# Patient Record
Sex: FEMALE | Race: WHITE | Employment: FULL TIME | URBAN - METROPOLITAN AREA
[De-identification: names, ages, dates, MRNs, and addresses within clinical notes are randomized per-mention and may not be internally consistent; named-entity substitution may affect disease eponyms.]

---

## 2017-03-17 PROBLEM — G89.29 CHRONIC BACK PAIN: Status: ACTIVE | Noted: 2017-03-17

## 2017-03-17 PROBLEM — M54.9 CHRONIC BACK PAIN: Status: ACTIVE | Noted: 2017-03-17

## 2017-03-23 PROBLEM — K57.32 DIVERTICULITIS OF LARGE INTESTINE WITHOUT PERFORATION OR ABSCESS WITHOUT BLEEDING: Status: ACTIVE | Noted: 2017-03-23

## 2017-04-27 ENCOUNTER — HOSPITAL ENCOUNTER (OUTPATIENT)
Dept: LAB | Age: 42
Discharge: HOME | End: 2017-04-27

## 2017-04-27 PROCEDURE — 88305 TISSUE EXAM BY PATHOLOGIST: CPT | Performed by: INTERNAL MEDICINE

## 2017-06-02 PROBLEM — K57.32 DIVERTICULITIS OF LARGE INTESTINE WITHOUT PERFORATION OR ABSCESS WITHOUT BLEEDING: Status: RESOLVED | Noted: 2017-03-23 | Resolved: 2017-06-02

## 2017-06-19 ENCOUNTER — HOSPITAL ENCOUNTER (OUTPATIENT)
Dept: PHYSICAL THERAPY | Age: 42
End: 2017-06-19
Attending: INTERNAL MEDICINE

## 2017-07-03 ENCOUNTER — HOSPITAL ENCOUNTER (OUTPATIENT)
Dept: PHYSICAL THERAPY | Age: 42
Discharge: HOME | End: 2017-07-03
Attending: INTERNAL MEDICINE
Payer: COMMERCIAL

## 2017-07-03 PROCEDURE — 97161 PT EVAL LOW COMPLEX 20 MIN: CPT | Performed by: PHYSICAL THERAPIST

## 2017-07-03 PROCEDURE — 97110 THERAPEUTIC EXERCISES: CPT | Performed by: PHYSICAL THERAPIST

## 2017-07-03 NOTE — PROGRESS NOTES
Ambulatory/Rehab Services H2 Model Falls Risk Assessment    Risk Factor Pts. ·   Confusion/Disorientation/Impulsivity  []    4 ·   Symptomatic Depression  []   2 ·   Altered Elimination  []   1 ·   Dizziness/Vertigo  []   1 ·   Gender (Male)  []   1 ·   Any administered antiepileptics (anticonvulsants):  []   2 ·   Any administered benzodiazepines:  []   1 ·   Visual Impairment (specify):  []   1 ·   Portable Oxygen Use  []   1 ·   Orthostatic ? BP  []   1 ·   History of Recent Falls (within 3 mos.)  []   5     Ability to Rise from Chair (choose one) Pts. ·   Ability to rise in a single movement  []   0 ·   Pushes up, successful in one attempt  []   1 ·   Multiple attempts, but successful  []   3 ·   Unable to rise without assistance  []   4   Total: (5 or greater = High Risk) 0     Falls Prevention Plan:   []                Physical Limitations to Exercise (specify):   []                Mobility Assistance Device (type):   []                Exercise/Equipment Adaptation (specify):    ©2010 [facility] [employer] [address] Patent #[id number].  Federal Law prohibits the replication, distribution or use without written permission from [facility] [employer]

## 2017-07-03 NOTE — PROGRESS NOTES
[facility]  : 1975 [address] at [place] [address] [address], [place], [address] [address]  [phone]   [phone]         OUTPATIENT PHYSICAL THERAPY:Initial Assessment 7/3/2017    ICD-10: Treatment Diagnosis: (M 25.551) R hip pain, (M 54.5) LBP, and (M62.81) muscle weakness  Precautions/Allergies: Other plant, animal, environmental none  Fall Risk Score: 0 (? 5 = High Risk)  MD Orders: Eval and rectus diastesis MEDICAL/REFERRING DIAGNOSIS:  Separation of muscle (nontraumatic), other site [M62.08]   DATE OF ONSET: 7/3/17  REFERRING PHYSICIAN: Paul Mello MD  RETURN PHYSICIAN APPOINTMENT: No f/u scheduled     INITIAL ASSESSMENT:  Ms. Diony Duque presents today with signs and symptoms consistent with the diagnosis of rectus diastesis secondary to pregnancy resulting in LBP and R hip pain from weak abdominals and hip musculature. She also presents with a forward L innominate rotation and rotational fault at L4-L5 due to core instability. Pt will benefit from skilled PT to address the following deficits. PROBLEM LIST (Impacting functional limitations):  1. Decreased Strength  2. Decreased ADL/Functional Activities  3. Decreased Transfer Abilities  4. Decreased Ambulation Ability/Technique  5. Decreased Balance  6. Increased Pain  7. Decreased Flexibility/Joint Mobility INTERVENTIONS PLANNED:  1. Balance Exercise  2. Bed Mobility  3. Cold  4. Cryotherapy  5. Electrical Stimulation  6. Heat  7. Home Exercise Program (HEP)  8. Manual Therapy  9. Neuromuscular Re-education/Strengthening  10. Range of Motion (ROM)  11. Therapeutic Exercise/Strengthening  12. Transfer Training   TREATMENT PLAN:  Effective Dates: 7/3/17 TO 9/3/17. Frequency/Duration: 2 times a week for 8 weeks  GOALS: (Goals have been discussed and agreed upon with patient.)  Short-Term Functional Goals: Time Frame: 4-6 weeks  1.  Pt will be compliant and independent with HEP, log rolling, car transfers and demonstrating correct, erect posture without an increase in back or hip pain. 2. Pt will improve LEFS score to 65/80 to increase pt's overall functional mobility. 3. Pt to subjectively report a decrease in pain to 1-2/10 @ worst to increase pt's ease with turning over in bed, and car transfers. 4. Pt will improve Lumbar AROM to Ext: 50% and B SB/ROT to 50% to increase pt's overall trunk mobility. 5. Pt will improve abdominal and B hip strength to > 3 to 3+/5 to allow pt to maintain neutral pelvic and lumbar alignment. Discharge Goals: Time Frame: 6-8 weeks  1. Pt will improve LEFS score to > 70/80 to increase pt's overall functional mobility. 2. Bed mobility and car transfers will be painfree. 3. Pt will improve Lumbar AROM to 75% of normal motion throughout with pain 1-2/10 at worst to increase pt's overall functional mobility. 4. Pt will return to gym activities, as well as, yoga/pilates maintaining normal pelvic and lumbar alignment with no incerase in LBP. 5. Pt will be discharged from PT to [facility]. Rehabilitation Potential For Stated Goals: Good  Regarding Hariniarpit Victor Hugo Ahuja's therapy, I certify that the treatment plan above will be carried out by a therapist or under their direction. Thank you for this referral,  Bianca Meyers, PT     Referring Physician Signature: Radha Shane MD              Date                    The information in this section was collected on 7/3/17 (except where otherwise noted). HISTORY:   History of Present Injury/Illness (Reason for Referral):  Pt reports she's had LBP and abdominal soreness off and on for the last 4 years since her second child was born. She suffered a rectus diastesis with him. 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 She states she feels when she exercises, her back and hip pain increase.    Past Medical History/Comorbidities: Ms. Macarena Blunt  has a past medical history of Abnormal Papanicolaou smear of cervix; Migraines; and Ovarian cyst.  Ms. Macarena Blunt  has a past surgical history that includes appendectomy;  section (2013); pelvic laparoscopy; cholecystectomy (2013); and colposcopy. Social History/Living Environment:     Pt lives in a 2 story home with her  and 2 children. She denies c/o LBP or hip pain with ascending/descending stairs. Prior Level of Function/Work/Activity:  Pt denies having rectus diastesis, abdominal weakness or LBP prior to her second pregnancy 4 years ago. Current Medications:       Current Outpatient Prescriptions:     butalbital-acetaminophen-caffeine (FIORICET, ESGIC) -40 mg per tablet, Take 1 Tab by mouth every six (6) hours as needed for Pain. Max Daily Amount: 4 Tabs., Disp: 20 Tab, Rfl: 3    loratadine (CLARITIN) 10 mg tablet, Take 10 mg by mouth daily as needed for Allergies. , Disp: , Rfl:    Date Last Reviewed:  7/3/17    Number of Personal Factors/Comorbidities that affect the Plan of Care:  0: LOW COMPLEXITY   EXAMINATION:   Observation/Orthostatic Postural Assessment:          Pt stands with increased weight through L LE off-weighting R. Pt denies c/o R side LBP or R hip pain with standing today. Palpation: pt minimally tender with palpation at R side L4-L5.   Posture/Deformity:  Lumbar AROM: % of normal motion in standing    Lumbar spine Date:  7/3/17 Date:   Date:     Direction  Parameters Parameters Parameters   Flexion  75%     Extension  25% w/ p     Rotation  R: 25% w/p  L: 25% w/p     Side bending  R: 25% w/p  L: 25% w/p     Hip R:WNL  L:WNL                   Strength Date:  7/3/17 Date:   Date:     Core/LE Parameters Parameters Parameters   Hip Flex R:3/5  L:3/5     Hip Ext R:3-/5  L:3-/5     Hip ABD R:3-/5  L:3-/5     Hip ADD R:3/5  L:3/5     Hip ER R:3/5  L:3/5     Hip IR R:3/5  L:3/5     Knee Ext R:4/5  L:4/5     Knee Flex R:3/5  L:3/5     Ankle DF R:4/  L:4/ Ankle PF R:4/5  L:4/5     Double Squat (L4) Able w/out UE A     Heel Walk (L5) Able w/out UE A     Toe Walk (S1) Able w/out UE A         Sensation: Normal and equal B throughout  Anterior Thigh (L3):  Medial Foot (L4):  Dorsal Foot (L5):  Lateral Foot (S1):    Special Test/Function:  Pelvic Obliquity: +; Pt with L anterior innominate rotation  Rotational Fault: FRSR @ L4 and L5  Accessory Mobility: Diminished L3-L5      Body Structures Involved:  1. Bones  2. Joints  3. Muscles  4. Ligaments Body Functions Affected:  1. Sensory/Pain  2. Neuromusculoskeletal  3. Movement Related  Activities and Participation Affected:  1. Mobility  2. Domestic Life  3. Community, Social and [place]  4. 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 Number of elements (examined above) that affect the Plan of Care:  1-2: LOW COMPLEXITY   CLINICAL PRESENTATION:    Presentation:  Stable and uncomplicated: LOW COMPLEXITY   CLINICAL DECISION MAKING:   Outcome Measure:   LEFS: 59/80=74% Function; 26% Disability    Medical Necessity:   · Patient is expected to demonstrate progress in strength, range of motion, balance, coordination and functional technique to increase independence with returning to gym activities and classes safely. .  Reason for Services/Other Comments:  · Patient continues to require present interventions due to patient's inability to maintain neutral pelvic and lumbar alignment due to abdominal, hip and lumbar weakness. .     Use of outcome tool(s) and clinical judgement create a POC that gives a: Clear prediction of patient's progress: LOW COMPLEXITY            TREATMENT:   (In addition to Assessment/Re-Assessment sessions the following treatments were rendered)  Pre-treatment Symptoms/Complaints:  Pt c/o R anterior hip pain, low back instability and abdominal weakness.   Pain: Initial:     3-4 Post Session:  1-2     THERAPEUTIC EXERCISE: (30 minutes):  Exercises per grid below to improve mobility and strength. Required moderate visual, verbal and tactile cues to promote proper body alignment, promote proper body posture and promote proper body breathing techniques. Progressed range and repetitions as indicated. Re-assessment was performed today (see above assessment section). Separate time was dedicated today for this re-assessment. Date:  7/3/17 Date:   Date:     Activity/Exercise Parameters Parameters Parameters   TAs 10 x 10sec     TAs w/ add sq 10 x 10sec     TAs w/ add sq and bridging 10 x 10sec     clams 15x B     HS/GS str 3 x 30sec B     Education Log-rolling and car transfers     MET for pelvic obliquity R quad/L HS     Manual correction for L4/5 rot Done seated           Treatment/Session Assessment: Pt with increased lumbar and pelvic laxity due to abdominal and hip weakness, as well as, pt just stopped nursing her 3year old ~ 3 months ago indicating hormone levels may not yet have stabilized. · Response to Treatment:  Pt's pelvic obliquity and rotational fault were easily corrected with MET and gentle mobilizations. She did well with the addition of stabilization exercises above. · Compliance with Program/Exercises: Will assess as treatment progresses. · Recommendations/Intent for next treatment session: \"Next visit will focus on advancements to more challenging activities and reduction in assistance provided\". Advance TA activities, as well as, hip strengthening.   Total Treatment Duration:PT Patient Time In/Time Out  Time In: 1310  Time Out: 3400 [facility], PT

## 2017-07-07 ENCOUNTER — HOSPITAL ENCOUNTER (OUTPATIENT)
Dept: PHYSICAL THERAPY | Age: 42
Discharge: HOME | End: 2017-07-07
Attending: INTERNAL MEDICINE
Payer: COMMERCIAL

## 2017-07-07 DIAGNOSIS — M62.08 RECTUS DIASTASIS: ICD-10-CM

## 2017-07-07 PROCEDURE — 97140 MANUAL THERAPY 1/> REGIONS: CPT | Performed by: PHYSICAL THERAPIST

## 2017-07-07 PROCEDURE — 97110 THERAPEUTIC EXERCISES: CPT | Performed by: PHYSICAL THERAPIST

## 2017-07-11 ENCOUNTER — HOSPITAL ENCOUNTER (OUTPATIENT)
Dept: PHYSICAL THERAPY | Age: 42
Discharge: HOME | End: 2017-07-11
Attending: INTERNAL MEDICINE
Payer: COMMERCIAL

## 2017-07-11 PROCEDURE — 97110 THERAPEUTIC EXERCISES: CPT | Performed by: PHYSICAL THERAPIST

## 2017-07-11 NOTE — PROGRESS NOTES
Jackelyn Olivia  : 1975 [address] [place] [address] at [address] [place] [address], [address], [address] [facility]  [phone]   [phone]         OUTPATIENT PHYSICAL THERAPY:Initial Assessment and Daily Note 2017    ICD-10: Treatment Diagnosis: (M 25.551) R hip pain, (M 54.5) LBP, and (M62.81) muscle weakness  Precautions/Allergies: Other plant, animal, environmental none  Fall Risk Score: 0 (? 5 = High Risk)  MD Orders: Eval and rectus diastesis MEDICAL/REFERRING DIAGNOSIS:  Separation of muscle (nontraumatic), other site [M62.08]   DATE OF ONSET: 7/3/17  REFERRING PHYSICIAN: Loly Sanchez MD  RETURN PHYSICIAN APPOINTMENT: No f/u scheduled     INITIAL ASSESSMENT:  Ms. Rhonda Durán presents today with signs and symptoms consistent with the diagnosis of rectus diastesis secondary to pregnancy resulting in LBP and R hip pain from weak abdominals and hip musculature. She also presents with a forward L innominate rotation and rotational fault at L4-L5 due to core instability. Pt will benefit from skilled PT to address the following deficits. PROBLEM LIST (Impacting functional limitations):  1. Decreased Strength  2. Decreased ADL/Functional Activities  3. Decreased Transfer Abilities  4. Decreased Ambulation Ability/Technique  5. Decreased Balance  6. Increased Pain  7. Decreased Flexibility/Joint Mobility INTERVENTIONS PLANNED:  1. Balance Exercise  2. Bed Mobility  3. Cold  4. Cryotherapy  5. Electrical Stimulation  6. Heat  7. Home Exercise Program (HEP)  8. Manual Therapy  9. Neuromuscular Re-education/Strengthening  10. Range of Motion (ROM)  11. Therapeutic Exercise/Strengthening  12. Transfer Training   TREATMENT PLAN:  Effective Dates: 7/3/17 TO 9/3/17. Frequency/Duration: 2 times a week for 8 weeks  GOALS: (Goals have been discussed and agreed upon with patient.)  Short-Term Functional Goals: Time Frame: 4-6 weeks  1.  Pt will be compliant and independent with HEP, log rolling, car transfers and demonstrating correct, erect posture without an increase in back or hip pain. 2. Pt will improve LEFS score to 65/80 to increase pt's overall functional mobility. 3. Pt to subjectively report a decrease in pain to 1-2/10 @ worst to increase pt's ease with turning over in bed, and car transfers. 4. Pt will improve Lumbar AROM to Ext: 50% and B SB/ROT to 50% to increase pt's overall trunk mobility. 5. Pt will improve abdominal and B hip strength to > 3 to 3+/5 to allow pt to maintain neutral pelvic and lumbar alignment. Discharge Goals: Time Frame: 6-8 weeks  1. Pt will improve LEFS score to > 70/80 to increase pt's overall functional mobility. 2. Bed mobility and car transfers will be painfree. 3. Pt will improve Lumbar AROM to 75% of normal motion throughout with pain 1-2/10 at worst to increase pt's overall functional mobility. 4. Pt will return to gym activities, as well as, yoga/pilates maintaining normal pelvic and lumbar alignment with no incerase in LBP. 5. Pt will be discharged from PT to [facility]. Rehabilitation Potential For Stated Goals: Good  Regarding Suki Rubio Seymour's therapy, I certify that the treatment plan above will be carried out by a therapist or under their direction. Thank you for this referral,  Meenu Metzger PT               The information in this section was collected on 7/3/17 (except where otherwise noted). HISTORY:   History of Present Injury/Illness (Reason for Referral):  Pt reports she's had LBP and abdominal soreness off and on for the last 4 years since her second child was born. She suffered a rectus diastesis with him. 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 She states she feels when she exercises, her back and hip pain increase.    Past Medical History/Comorbidities:   Ms. Hilario Van  has a past medical history of Abnormal Papanicolaou smear of cervix; Migraines; and Ovarian cyst.  Ms. Nany Bonilla  has a past surgical history that includes appendectomy;  section (2013); pelvic laparoscopy; cholecystectomy (2013); and colposcopy. Social History/Living Environment:     Pt lives in a 2 story home with her  and 2 children. She denies c/o LBP or hip pain with ascending/descending stairs. Prior Level of Function/Work/Activity:  Pt denies having rectus diastesis, abdominal weakness or LBP prior to her second pregnancy 4 years ago. Current Medications:       Current Outpatient Prescriptions:     butalbital-acetaminophen-caffeine (FIORICET, ESGIC) -40 mg per tablet, Take 1 Tab by mouth every six (6) hours as needed for Pain. Max Daily Amount: 4 Tabs., Disp: 20 Tab, Rfl: 3    loratadine (CLARITIN) 10 mg tablet, Take 10 mg by mouth daily as needed for Allergies. , Disp: , Rfl:    Date Last Reviewed:  7/3/17   EXAMINATION:   Observation/Orthostatic Postural Assessment:          Pt stands with increased weight through L LE off-weighting R. Pt denies c/o R side LBP or R hip pain with standing today. Palpation: pt minimally tender with palpation at R side L4-L5.   Posture/Deformity:  Lumbar AROM: % of normal motion in standing    Lumbar spine Date:  7/3/17 Date:   Date:     Direction  Parameters Parameters Parameters   Flexion  75%     Extension  25% w/ p     Rotation  R: 25% w/p  L: 25% w/p     Side bending  R: 25% w/p  L: 25% w/p     Hip R:WNL  L:WNL                   Strength Date:  7/3/17 Date:   Date:     Core/LE Parameters Parameters Parameters   Hip Flex R:3/5  L:3/5     Hip Ext R:3-/5  L:3-/5     Hip ABD R:3-/5  L:3-/5     Hip ADD R:3/5  L:3/5     Hip ER R:3/5  L:3/5     Hip IR R:3/5  L:3/5     Knee Ext R:4/5  L:4/5     Knee Flex R:3/5  L:3/5     Ankle DF R:4/5  L:4/5     Ankle PF R:4/5  L:4/5     Double Squat (L4) Able w/out UE A     Heel Walk (L5) Able w/out UE A     Toe Walk (S1) Able w/out UE A         Sensation: Normal and equal B throughout  Anterior Thigh (L3):  Medial Foot (L4):  Dorsal Foot (L5):  Lateral Foot (S1):    Special Test/Function:  Pelvic Obliquity: +; Pt with L anterior innominate rotation  Rotational Fault: FRSR @ L4 and L5  Accessory Mobility: Diminished L3-L5     CLINICAL PRESENTATION:   CLINICAL DECISION MAKING:   Outcome Measure:   LEFS: 59/80=74% Function; 26% Disability    Medical Necessity:   · Patient is expected to demonstrate progress in strength, range of motion, balance, coordination and functional technique to increase independence with returning to gym activities and classes safely. .  Reason for Services/Other Comments:  · Patient continues to require present interventions due to patient's inability to maintain neutral pelvic and lumbar alignment due to abdominal, hip and lumbar weakness. .            TREATMENT:   (In addition to Assessment/Re-Assessment sessions the following treatments were rendered)  Pre-treatment Symptoms/Complaints:  Pt reports she felt really good when she left her last PT visit, and for ~ 36 hours. She states her R anterior hip/groin pain returned on Sunday after moving furniture. She reports daily compliance with HEP. Pain: Initial:     3 Post Session:  1   MANUAL THERAPY: (5minutes): MET performed to correct pelvic obliquity  (L anterior innominate rotation) by engaging L HS and R quads. She also required gentle sacral and lumbar mobilizations (grade II and III) to correct mild rotational fault at L5 [facility]) and sacral torsion to the R.     THERAPEUTIC EXERCISE: (55 minutes):  Exercises per grid below to improve mobility and strength. Required moderate visual, verbal and tactile cues to promote proper body alignment, promote proper body posture and promote proper body breathing techniques. Progressed range and repetitions as indicated.      Date:  7/3/17 Date:  7/7/17 Date:  7/11/17   Activity/Exercise Parameters Parameters Parameters   Nu-Step   L4, 7min   TAs 10 x 10sec 10 x 5 sec 10 x 5 sec   TAs w/ add sq 10 x 10sec 10 x 5 sec 10 x 5 sec   TAs w/ add sq and bridging 10 x 10sec 10 x 5 sec 10 x 5 sec   LTR  20x 10x   Bridging w/ TA and band  RTB, 20x RTB/BTB, 30x total   clams 15x B  RTB, 2 x 15 B   Supine Ankle taps (EO/IO)   2 x 15 B   SLR   2 x 15 B   Supine hip flexor stretch off mat   3 x 20sec B   Piriformis stretch (post)  3 x 20sec B 3 x 20sec B   Mod piriformis stretch (ant)  3 x 20sec B    S/L hip flexor str w/ strap  3 x 20sec B    HS/GS str 3 x 30sec B     Resisted side-stepping   4L,RTB   Resisted diagonals    2L ea, RTB   Education Log-rolling and car transfers Use of ice and NSAIDS HEP 2x/day; NSAIDS   MET for pelvic obliquity R quad/L HS done done   Manual correction for L4/5 rot Done seated done done   Foam roll to B piriformis/TFL/ITB   6\"                     Treatment/Session Assessment: Pt with only mild R anterior innominate rotation on entry today. She was easily corrected with 1 round of MET. Her R hip musculature fatigued very quickly with addition of resisted calms, SLR and resisted side-stepping and diagonals due to abdominal and hip weakness. · Response to Treatment:  Pt denied an increase in pain during treatment today, despite progression of strengthening exercises. She did well with above exercises, therefore I added those to her HEP. · Compliance with Program/Exercises: Pt reports compliance with HEP  1x/daily. · Recommendations/Intent for next treatment session: \"Next visit will focus on advancements to more challenging activities and reduction in assistance provided\". Continue to advance TA activities, as well as, hip strengthening as pt is able.     Future Appointments  Date Time Provider Nila Luz   7/14/2017 8:00 AM Josselin Armando, PT [facility]   7/18/2017 8:00 AM Josselin Armando PT [facility]   7/21/2017 8:00 AM Josselin Armando PT [facility]   7/25/2017 8:00 AM Josselin Armando, PT [facility] 7/25/2017 11:30 AM [facility] ROOM 1 [facility] S[facility]   7/28/2017 8:00 AM Charly Kebede, PT [facility]   2/23/2018 8:00 AM [facility] LAB [facility]   3/2/2018 10:00 AM Erasmo Cowden, MD [facility] CAF CAF       Total Treatment Duration:PT Patient Time In/Time Out  Time In: 0805  Time Out: Lindsey Cosby 59, PT

## 2017-07-14 ENCOUNTER — HOSPITAL ENCOUNTER (OUTPATIENT)
Dept: PHYSICAL THERAPY | Age: 42
Discharge: HOME | End: 2017-07-14
Attending: INTERNAL MEDICINE
Payer: COMMERCIAL

## 2017-07-14 PROCEDURE — 97110 THERAPEUTIC EXERCISES: CPT | Performed by: PHYSICAL THERAPIST

## 2017-07-14 PROCEDURE — 97140 MANUAL THERAPY 1/> REGIONS: CPT | Performed by: PHYSICAL THERAPIST

## 2017-07-14 NOTE — PROGRESS NOTES
Chang Silveira  : 1975 [id number] at [address], [address] [place], [address] [address]  [phone]   [phone]         OUTPATIENT PHYSICAL THERAPY:Initial Assessment and Daily Note 2017    ICD-10: Treatment Diagnosis: (M 25.551) R hip pain, (M 54.5) LBP, and (M62.81) muscle weakness  Precautions/Allergies: Other plant, animal, environmental none  Fall Risk Score: 0 (? 5 = High Risk)  MD Orders: Eval and rectus diastesis MEDICAL/REFERRING DIAGNOSIS:  Separation of muscle (nontraumatic), other site [M62.08]   DATE OF ONSET: 7/3/17  REFERRING PHYSICIAN: Laurie Dangelo MD  RETURN PHYSICIAN APPOINTMENT: No f/u scheduled     INITIAL ASSESSMENT:  Ms. Belle Solis presents today with signs and symptoms consistent with the diagnosis of rectus diastesis secondary to pregnancy resulting in LBP and R hip pain from weak abdominals and hip musculature. She also presents with a forward L innominate rotation and rotational fault at L4-L5 due to core instability. Pt will benefit from skilled PT to address the following deficits. PROBLEM LIST (Impacting functional limitations):  1. Decreased Strength  2. Decreased ADL/Functional Activities  3. Decreased Transfer Abilities  4. Decreased Ambulation Ability/Technique  5. Decreased Balance  6. Increased Pain  7. Decreased Flexibility/Joint Mobility INTERVENTIONS PLANNED:  1. Balance Exercise  2. Bed Mobility  3. Cold  4. Cryotherapy  5. Electrical Stimulation  6. Heat  7. Home Exercise Program (HEP)  8. Manual Therapy  9. Neuromuscular Re-education/Strengthening  10. Range of Motion (ROM)  11. Therapeutic Exercise/Strengthening  12. Transfer Training   TREATMENT PLAN:  Effective Dates: 7/3/17 TO 9/3/17. Frequency/Duration: 2 times a week for 8 weeks  GOALS: (Goals have been discussed and agreed upon with patient.)  Short-Term Functional Goals: Time Frame: 4-6 weeks  1.  Pt will be compliant and independent with HEP, log rolling, car transfers and demonstrating correct, erect posture without an increase in back or hip pain. 2. Pt will improve LEFS score to 65/80 to increase pt's overall functional mobility. 3. Pt to subjectively report a decrease in pain to 1-2/10 @ worst to increase pt's ease with turning over in bed, and car transfers. 4. Pt will improve Lumbar AROM to Ext: 50% and B SB/ROT to 50% to increase pt's overall trunk mobility. 5. Pt will improve abdominal and B hip strength to > 3 to 3+/5 to allow pt to maintain neutral pelvic and lumbar alignment. Discharge Goals: Time Frame: 6-8 weeks  1. Pt will improve LEFS score to > 70/80 to increase pt's overall functional mobility. 2. Bed mobility and car transfers will be painfree. 3. Pt will improve Lumbar AROM to 75% of normal motion throughout with pain 1-2/10 at worst to increase pt's overall functional mobility. 4. Pt will return to gym activities, as well as, yoga/pilates maintaining normal pelvic and lumbar alignment with no incerase in LBP. 5. Pt will be discharged from PT to [facility]. Rehabilitation Potential For Stated Goals: Good  Regarding Ysabel Ahuja's therapy, I certify that the treatment plan above will be carried out by a therapist or under their direction. Thank you for this referral,  Duane Allison PT               The information in this section was collected on 7/3/17 (except where otherwise noted). HISTORY:   History of Present Injury/Illness (Reason for Referral):  Pt reports she's had LBP and abdominal soreness off and on for the last 4 years since her second child was born. She suffered a rectus diastesis with him. 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 She states she feels when she exercises, her back and hip pain increase.    Past Medical History/Comorbidities:   Ms. Uzma Estrada  has a past medical history of Abnormal Papanicolaou smear of cervix; Migraines; and Ovarian cyst.  Ms. Lu Phelan  has a past surgical history that includes appendectomy;  section (2013); pelvic laparoscopy; cholecystectomy (2013); and colposcopy. Social History/Living Environment:     Pt lives in a 2 story home with her  and 2 children. She denies c/o LBP or hip pain with ascending/descending stairs. Prior Level of Function/Work/Activity:  Pt denies having rectus diastesis, abdominal weakness or LBP prior to her second pregnancy 4 years ago. Current Medications:       Current Outpatient Prescriptions:     butalbital-acetaminophen-caffeine (FIORICET, ESGIC) -40 mg per tablet, Take 1 Tab by mouth every six (6) hours as needed for Pain. Max Daily Amount: 4 Tabs., Disp: 20 Tab, Rfl: 3    loratadine (CLARITIN) 10 mg tablet, Take 10 mg by mouth daily as needed for Allergies. , Disp: , Rfl:    Date Last Reviewed:  7/3/17   EXAMINATION:   Observation/Orthostatic Postural Assessment:          Pt stands with increased weight through L LE off-weighting R. Pt denies c/o R side LBP or R hip pain with standing today. Palpation: pt minimally tender with palpation at R side L4-L5.   Posture/Deformity:  Lumbar AROM: % of normal motion in standing    Lumbar spine Date:  7/3/17 Date:   Date:     Direction  Parameters Parameters Parameters   Flexion  75%     Extension  25% w/ p     Rotation  R: 25% w/p  L: 25% w/p     Side bending  R: 25% w/p  L: 25% w/p     Hip R:WNL  L:WNL                   Strength Date:  7/3/17 Date:   Date:     Core/LE Parameters Parameters Parameters   Hip Flex R:3/5  L:3/5     Hip Ext R:3-/5  L:3-/5     Hip ABD R:3-/5  L:3-/5     Hip ADD R:3/5  L:3/5     Hip ER R:3/5  L:3/5     Hip IR R:3/5  L:3/5     Knee Ext R:4/5  L:4/5     Knee Flex R:3/5  L:3/5     Ankle DF R:4/5  L:4/5     Ankle PF R:4/5  L:4/5     Double Squat (L4) Able w/out UE A     Heel Walk (L5) Able w/out UE A     Toe Walk (S1) Able w/out UE A         Sensation: Normal and equal B throughout  Anterior Thigh (L3):  Medial Foot (L4):  Dorsal Foot (L5):  Lateral Foot (S1):    Special Test/Function:  Pelvic Obliquity: +; Pt with L anterior innominate rotation  Rotational Fault: FRSR @ L4 and L5  Accessory Mobility: Diminished L3-L5     CLINICAL PRESENTATION:   CLINICAL DECISION MAKING:   Outcome Measure:   LEFS: 59/80=74% Function; 26% Disability    Medical Necessity:   · Patient is expected to demonstrate progress in strength, range of motion, balance, coordination and functional technique to increase independence with returning to gym activities and classes safely. .  Reason for Services/Other Comments:  · Patient continues to require present interventions due to patient's inability to maintain neutral pelvic and lumbar alignment due to abdominal, hip and lumbar weakness. .            TREATMENT:   (In addition to Assessment/Re-Assessment sessions the following treatments were rendered)  Pre-treatment Symptoms/Complaints:  Pt reports she's had 2 instances of \"zinging\" R hip pain that appeared randomly and  And resolved quickly since her last PT visit earlier this week. She reports overall having less frequent and less intense R hip/LB pain since beginning PT. She denies compliance with HEP this week due to having out of town guests staying at her home. Pain: Initial:     1 Post Session:  0   MANUAL THERAPY: (15minutes): MET performed to correct pelvic obliquity  (L anterior innominate rotation) by engaging L HS and R quads. She also required gentle sacral and lumbar mobilizations (grade II and III) to correct mild rotational fault at L5 [facility]) and sacral torsion to the R.     THERAPEUTIC EXERCISE: (45 minutes):  Exercises per grid below to improve mobility and strength. Required moderate visual, verbal and tactile cues to promote proper body alignment, promote proper body posture and promote proper body breathing techniques. Progressed range and repetitions as indicated. Date:  7/3/17 Date:  7/7/17 Date:  7/11/17 Date:   7/14/17   Activity/Exercise Parameters Parameters Parameters    Nu-Step   L4, 7min    TAs 10 x 10sec 10 x 5 sec 10 x 5 sec 10 x 5 sec   TAs w/ add sq 10 x 10sec 10 x 5 sec 10 x 5 sec 10 x 5 sec   TAs w/ add sq and bridging 10 x 10sec 10 x 5 sec 10 x 5 sec 10 x 5 sec   LTR  20x 10x 10x   Bridging w/ TA and band  RTB, 20x RTB/BTB, 30x total GTB, 20x   clams 15x B  RTB, 2 x 15 B GTB, 2 x 10   Firehydrants    15x B   Donkey kicks    15x B   Supine Ankle taps (EO/IO)   2 x 15 B    SLR   2 x 15 B    Supine hip flexor stretch off mat   3 x 20sec B 3 x 20sec   Piriformis stretch (post)  3 x 20sec B 3 x 20sec B 3 x 30sec B   Mod piriformis stretch (ant)  3 x 20sec B     S/L hip flexor str w/ strap  3 x 20sec B     HS/GS str 3 x 30sec B   3 x 30sec B   Resisted side-stepping   4L,RTB 4L, GTB   Resisted diagonals    2L ea, RTB 2L ea, GTB   Education Log-rolling and car transfers Use of ice and NSAIDS HEP 2x/day; NSAIDS HEP 2x/day   MET for pelvic obliquity R quad/L HS done done Done 4x today   Manual correction for L4/5 rot Done seated done done Done   Foam roll to B piriformis/TFL/ITB   6\" 6\"                       Treatment/Session Assessment: Pt with significant R anterior innominate rotation on entry today. She was easily corrected with MET with R HS and L quad activation. She unfortunately anteriorly rotated again while stretching R hip flexors causing R anterior/posterior hip pain. She was corrected again, and asked to avoid aggressively stretching hip flexors and focus more on stretching HS and piriformis this weekend. She fatigued very quickly with fire hydrants and donkey kicks due to continued core and hip weakness. · Response to Treatment:  Pt reported no increase in pain during treatment, only fatigue with increased core/hip work. · Compliance with Program/Exercises: Noncompliance with HEP this week due to having out of town guests.    · Recommendations/Intent for next treatment session: \"Next visit will focus on advancements to more challenging activities and reduction in assistance provided\". Continue to advance TA activities, as well as, hip strengthening as pt is able.     Future Appointments  Date Time Provider Nila Natty   7/18/2017 8:00 AM [facility]   7/21/2017 8:00 AM [facility]   7/25/2017 8:00 AM Amee Peck PT [facility]   7/25/2017 11:30 AM [facility] ROOM 1 SFERMAM E   7/28/2017 8:00 AM [facility]   2/23/2018 8:00 AM [facility] LAB [facility]SA [facility]   3/2/2018 10:00 AM Lucius Dodd MD [facility]       Total Treatment Duration:PT Patient Time In/Time Out  Time In: 0800  Time Out: 0900    Amee Peck PT

## 2017-07-18 ENCOUNTER — HOSPITAL ENCOUNTER (OUTPATIENT)
Dept: PHYSICAL THERAPY | Age: 42
Discharge: HOME | End: 2017-07-18
Attending: INTERNAL MEDICINE
Payer: COMMERCIAL

## 2017-07-18 PROCEDURE — 97110 THERAPEUTIC EXERCISES: CPT | Performed by: PHYSICAL THERAPIST

## 2017-07-18 NOTE — PROGRESS NOTES
Leydi Schmitz  : 1975 [address] at [address], [facility], [address]  [phone]   [phone]         OUTPATIENT PHYSICAL THERAPY:Initial Assessment and Daily Note 2017    ICD-10: Treatment Diagnosis: (M 25.551) R hip pain, (M 54.5) LBP, and (M62.81) muscle weakness  Precautions/Allergies: Other plant, animal, environmental none  Fall Risk Score: 0 (? 5 = High Risk)  MD Orders: Eval and rectus diastesis MEDICAL/REFERRING DIAGNOSIS:  Separation of muscle (nontraumatic), other site [M62.08]   DATE OF ONSET: 7/3/17  REFERRING PHYSICIAN: Jesica Arroyo MD  RETURN PHYSICIAN APPOINTMENT: No f/u scheduled     INITIAL ASSESSMENT:  Ms. Diana Yang presents today with signs and symptoms consistent with the diagnosis of rectus diastesis secondary to pregnancy resulting in LBP and R hip pain from weak abdominals and hip musculature. She also presents with a forward L innominate rotation and rotational fault at L4-L5 due to core instability. Pt will benefit from skilled PT to address the following deficits. PROBLEM LIST (Impacting functional limitations):  1. Decreased Strength  2. Decreased ADL/Functional Activities  3. Decreased Transfer Abilities  4. Decreased Ambulation Ability/Technique  5. Decreased Balance  6. Increased Pain  7. Decreased Flexibility/Joint Mobility INTERVENTIONS PLANNED:  1. Balance Exercise  2. Bed Mobility  3. Cold  4. Cryotherapy  5. Electrical Stimulation  6. Heat  7. Home Exercise Program (HEP)  8. Manual Therapy  9. Neuromuscular Re-education/Strengthening  10. Range of Motion (ROM)  11. Therapeutic Exercise/Strengthening  12. Transfer Training   TREATMENT PLAN:  Effective Dates: 7/3/17 TO 9/3/17. Frequency/Duration: 2 times a week for 8 weeks  GOALS: (Goals have been discussed and agreed upon with patient.)  Short-Term Functional Goals: Time Frame: 4-6 weeks  1.  Pt will be compliant and independent with HEP, log rolling, car transfers and demonstrating correct, erect posture without an increase in back or hip pain. 2. Pt will improve LEFS score to 65/80 to increase pt's overall functional mobility. 3. Pt to subjectively report a decrease in pain to 1-2/10 @ worst to increase pt's ease with turning over in bed, and car transfers. 4. Pt will improve Lumbar AROM to Ext: 50% and B SB/ROT to 50% to increase pt's overall trunk mobility. 5. Pt will improve abdominal and B hip strength to > 3 to 3+/5 to allow pt to maintain neutral pelvic and lumbar alignment. Discharge Goals: Time Frame: 6-8 weeks  1. Pt will improve LEFS score to > 70/80 to increase pt's overall functional mobility. 2. Bed mobility and car transfers will be painfree. 3. Pt will improve Lumbar AROM to 75% of normal motion throughout with pain 1-2/10 at worst to increase pt's overall functional mobility. 4. Pt will return to gym activities, as well as, yoga/pilates maintaining normal pelvic and lumbar alignment with no incerase in LBP. 5. Pt will be discharged from PT to [facility]. Rehabilitation Potential For Stated Goals: Good  Regarding Tonya Shaw Ahuja's therapy, I certify that the treatment plan above will be carried out by a therapist or under their direction. Thank you for this referral,  Gabo Hirsch, PT               The information in this section was collected on 7/3/17 (except where otherwise noted). HISTORY:   History of Present Injury/Illness (Reason for Referral):  Pt reports she's had LBP and abdominal soreness off and on for the last 4 years since her second child was born. She suffered a rectus diastesis with him. 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 She states she feels when she exercises, her back and hip pain increase.    Past Medical History/Comorbidities:   Ms. Sandra Jenkins  has a past medical history of Abnormal Papanicolaou smear of cervix; Migraines; and Ovarian cyst.  Ms. Tucker Parra  has a past surgical history that includes appendectomy;  section (2013); pelvic laparoscopy; cholecystectomy (2013); and colposcopy. Social History/Living Environment:     Pt lives in a 2 story home with her  and 2 children. She denies c/o LBP or hip pain with ascending/descending stairs. Prior Level of Function/Work/Activity:  Pt denies having rectus diastesis, abdominal weakness or LBP prior to her second pregnancy 4 years ago. Current Medications:       Current Outpatient Prescriptions:     butalbital-acetaminophen-caffeine (FIORICET, ESGIC) -40 mg per tablet, Take 1 Tab by mouth every six (6) hours as needed for Pain. Max Daily Amount: 4 Tabs., Disp: 20 Tab, Rfl: 3    loratadine (CLARITIN) 10 mg tablet, Take 10 mg by mouth daily as needed for Allergies. , Disp: , Rfl:    Date Last Reviewed:  7/3/17   EXAMINATION:   Observation/Orthostatic Postural Assessment:          Pt stands with increased weight through L LE off-weighting R. Pt denies c/o R side LBP or R hip pain with standing today. Palpation: pt minimally tender with palpation at R side L4-L5.   Posture/Deformity:  Lumbar AROM: % of normal motion in standing    Lumbar spine Date:  7/3/17 Date:   Date:     Direction  Parameters Parameters Parameters   Flexion  75%     Extension  25% w/ p     Rotation  R: 25% w/p  L: 25% w/p     Side bending  R: 25% w/p  L: 25% w/p     Hip R:WNL  L:WNL                   Strength Date:  7/3/17 Date:   Date:     Core/LE Parameters Parameters Parameters   Hip Flex R:3/5  L:3/5     Hip Ext R:3-/5  L:3-/5     Hip ABD R:3-/5  L:3-/5     Hip ADD R:3/5  L:3/5     Hip ER R:3/5  L:3/5     Hip IR R:3/5  L:3/5     Knee Ext R:4/5  L:4/5     Knee Flex R:3/5  L:3/5     Ankle DF R:4/5  L:4/5     Ankle PF R:4/5  L:4/5     Double Squat (L4) Able w/out UE A     Heel Walk (L5) Able w/out UE A     Toe Walk (S1) Able w/out UE A         Sensation: Normal and equal B throughout  Anterior Thigh (L3):  Medial Foot (L4):  Dorsal Foot (L5):  Lateral Foot (S1):    Special Test/Function:  Pelvic Obliquity: +; Pt with L anterior innominate rotation  Rotational Fault: FRSR @ L4 and L5  Accessory Mobility: Diminished L3-L5     CLINICAL PRESENTATION:   CLINICAL DECISION MAKING:   Outcome Measure:   LEFS: 59/80=74% Function; 26% Disability    Medical Necessity:   · Patient is expected to demonstrate progress in strength, range of motion, balance, coordination and functional technique to increase independence with returning to gym activities and classes safely. .  Reason for Services/Other Comments:  · Patient continues to require present interventions due to patient's inability to maintain neutral pelvic and lumbar alignment due to abdominal, hip and lumbar weakness. .            TREATMENT:     Pre-treatment Symptoms/Complaints: Pt reports min soreness after last PT visit x 3-4 hours, but has felt great since then. She denies any c/o R hip pain. Pain: Initial:     0 Post Session:  0   MANUAL THERAPY: (15minutes): MET performed to correct pelvic obliquity  (L anterior innominate rotation) by engaging L HS and R quads. She also required gentle sacral and lumbar mobilizations (grade II and III) to correct mild rotational fault at L5 [facility]) and sacral torsion to the R. -------------------------omitted today     THERAPEUTIC EXERCISE: (60 minutes):  Exercises per grid below to improve mobility and strength. Required moderate visual, verbal and tactile cues to promote proper body alignment, promote proper body posture and promote proper body breathing techniques. Progressed range and repetitions as indicated.      Date:  7/7/17 Date:  7/11/17 Date:   7/14/17 Date:  7/18/17     Activity/Exercise Parameters Parameters Parameters Parameters     Nu-Step  L4, 7min  L4, 10min     TAs 10 x 5 sec 10 x 5 sec 10 x 5 sec      TAs w/ add sq 10 x 5 sec 10 x 5 sec 10 x 5 sec      TAs w/ add sq and bridging 10 x 5 sec 10 x 5 sec 10 x 5 sec      LTR 20x 10x 10x W/ green ball, 15x B     Bridging w/ TA and band RTB, 20x RTB/BTB, 30x total GTB, 20x W/green ball/triple threat,15x     clams  RTB, 2 x 15 B GTB, 2 x 10 GTB, 20x B     Firehydrants   15x B 20x B     Donkey kicks   15x B 20x B     Bird dog    15x B     Supine Ankle taps (EO/IO)  2 x 15 B  20x B     SLR  2 x 15 B       Supine hip flexor stretch off mat  3 x 20sec B 3 x 20sec      Piriformis stretch (post) 3 x 20sec B 3 x 20sec B 3 x 30sec B      Mod piriformis stretch (ant) 3 x 20sec B        S/L hip flexor str w/ strap 3 x 20sec B        HS/GS str   3 x 30sec B      Resisted side-stepping  4L,RTB 4L, GTB 4L, GTB     Resisted diagonals   2L ea, RTB 2L ea, GTB 4L, GTB     Education Use of ice and NSAIDS HEP 2x/day; NSAIDS HEP 2x/day      MET for pelvic obliquity done done Done 4x today Not needed     Manual correction for L4/5 rot done done Done Not needed     Foam roll to B piriformis/TFL/ITB  6\" 6\" 6\"                             Treatment/Session Assessment: Pt with normal pelvic and lumbar alignment and no pain on entry today. She requires verbal and tactile cueing to maintain level hips with fire hydrants, donkey kicks and bird dogs due to residual core and hip weakness. She was challenged by use of ball with LTR and bridging w/ HSC due to residual core weakness. Pt is to locate/purchase a ball prior to me giving that to her for homework. · Response to Treatment:  Pt with improving B hip and core strength/endurance as she tolerated 60min of strengthening with no increase in pain and without rest breaks required. · Compliance with Program/Exercises: Pt reports compliance with HEP 2x since last visit due to having a migraine. · Recommendations/Intent for next treatment session: \"Next visit will focus on advancements to more challenging activities and reduction in assistance provided\".  Try golfer's  to increase core and hip strength while improving proprioception.     Future Appointments  Date Time Provider Nila Luz   7/21/2017 8:00 AM Faby Snowden, PT [facility]   7/25/2017 8:00 AM Faby Snowden, PT [facility]   7/25/2017 11:30 AM [facility]"" [facility]   7/28/2017 8:00 AM Faby Snowden PT [facility]   2/23/2018 8:00 AM [facility] LAB SSA [facility]   3/2/2018 10:00 AM Stanislaw Noel MD [facility]       Total Treatment Duration:PT Patient Time In/Time Out  Time In: 0805  Time Out: [address]  [facility],

## 2017-07-21 ENCOUNTER — HOSPITAL ENCOUNTER (OUTPATIENT)
Dept: PHYSICAL THERAPY | Age: 42
Discharge: HOME | End: 2017-07-21
Attending: INTERNAL MEDICINE
Payer: COMMERCIAL

## 2017-07-21 PROCEDURE — 97140 MANUAL THERAPY 1/> REGIONS: CPT | Performed by: PHYSICAL THERAPIST

## 2017-07-21 PROCEDURE — 97110 THERAPEUTIC EXERCISES: CPT | Performed by: PHYSICAL THERAPIST

## 2017-07-21 NOTE — PROGRESS NOTES
Jackelyn Olivia  : 1975 [address] [facility] at [address], [address], [facility], [address] [address]  [phone]   [phone]         OUTPATIENT PHYSICAL THERAPY:Initial Assessment and Daily Note 2017    ICD-10: Treatment Diagnosis: (M 25.551) R hip pain, (M 54.5) LBP, and (M62.81) muscle weakness  Precautions/Allergies: Other plant, animal, environmental none  Fall Risk Score: 0 (? 5 = High Risk)  MD Orders: Eval and rectus diastesis MEDICAL/REFERRING DIAGNOSIS:  Separation of muscle (nontraumatic), other site [M62.08]   DATE OF ONSET: 7/3/17  REFERRING PHYSICIAN: Loly Sanchez MD  RETURN PHYSICIAN APPOINTMENT: No f/u scheduled     INITIAL ASSESSMENT:  Ms. Rhonda Durán presents today with signs and symptoms consistent with the diagnosis of rectus diastesis secondary to pregnancy resulting in LBP and R hip pain from weak abdominals and hip musculature. She also presents with a forward L innominate rotation and rotational fault at L4-L5 due to core instability. Pt will benefit from skilled PT to address the following deficits. PROBLEM LIST (Impacting functional limitations):  1. Decreased Strength  2. Decreased ADL/Functional Activities  3. Decreased Transfer Abilities  4. Decreased Ambulation Ability/Technique  5. Decreased Balance  6. Increased Pain  7. Decreased Flexibility/Joint Mobility INTERVENTIONS PLANNED:  1. Balance Exercise  2. Bed Mobility  3. Cold  4. Cryotherapy  5. Electrical Stimulation  6. Heat  7. Home Exercise Program (HEP)  8. Manual Therapy  9. Neuromuscular Re-education/Strengthening  10. Range of Motion (ROM)  11. Therapeutic Exercise/Strengthening  12. Transfer Training   TREATMENT PLAN:  Effective Dates: 7/3/17 TO 9/3/17. Frequency/Duration: 2 times a week for 8 weeks  GOALS: (Goals have been discussed and agreed upon with patient.)  Short-Term Functional Goals: Time Frame: 4-6 weeks  1.  Pt will be compliant and independent with HEP, log rolling, car transfers and demonstrating correct, erect posture without an increase in back or hip pain. 2. Pt will improve LEFS score to 65/80 to increase pt's overall functional mobility. 3. Pt to subjectively report a decrease in pain to 1-2/10 @ worst to increase pt's ease with turning over in bed, and car transfers. 4. Pt will improve Lumbar AROM to Ext: 50% and B SB/ROT to 50% to increase pt's overall trunk mobility. 5. Pt will improve abdominal and B hip strength to > 3 to 3+/5 to allow pt to maintain neutral pelvic and lumbar alignment. Discharge Goals: Time Frame: 6-8 weeks  1. Pt will improve LEFS score to > 70/80 to increase pt's overall functional mobility. 2. Bed mobility and car transfers will be painfree. 3. Pt will improve Lumbar AROM to 75% of normal motion throughout with pain 1-2/10 at worst to increase pt's overall functional mobility. 4. Pt will return to gym activities, as well as, yoga/pilates maintaining normal pelvic and lumbar alignment with no incerase in LBP. 5. Pt will be discharged from PT to [facility]. Rehabilitation Potential For Stated Goals: Good  Regarding Emeterio Ahuja's therapy, I certify that the treatment plan above will be carried out by a therapist or under their direction. Thank you for this referral,  Bianca Meyers PT               The information in this section was collected on 7/3/17 (except where otherwise noted). HISTORY:   History of Present Injury/Illness (Reason for Referral):  Pt reports she's had LBP and abdominal soreness off and on for the last 4 years since her second child was born. She suffered a rectus diastesis with him. 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 She states she feels when she exercises, her back and hip pain increase.    Past Medical History/Comorbidities:   Ms. Maria M Anderson  has a past medical history of Abnormal Papanicolaou smear of cervix; Migraines; and Ovarian cyst.  Ms. Dionicio Scales  has a past surgical history that includes appendectomy;  section (2013); pelvic laparoscopy; cholecystectomy (2013); and colposcopy. Social History/Living Environment:     Pt lives in a 2 story home with her  and 2 children. She denies c/o LBP or hip pain with ascending/descending stairs. Prior Level of Function/Work/Activity:  Pt denies having rectus diastesis, abdominal weakness or LBP prior to her second pregnancy 4 years ago. Current Medications:       Current Outpatient Prescriptions:     butalbital-acetaminophen-caffeine (FIORICET, ESGIC) -40 mg per tablet, Take 1 Tab by mouth every six (6) hours as needed for Pain. Max Daily Amount: 4 Tabs., Disp: 20 Tab, Rfl: 3    loratadine (CLARITIN) 10 mg tablet, Take 10 mg by mouth daily as needed for Allergies. , Disp: , Rfl:    Date Last Reviewed:  7/3/17   EXAMINATION:   Observation/Orthostatic Postural Assessment:          Pt stands with increased weight through L LE off-weighting R. Pt denies c/o R side LBP or R hip pain with standing today. Palpation: pt minimally tender with palpation at R side L4-L5.   Posture/Deformity:  Lumbar AROM: % of normal motion in standing    Lumbar spine Date:  7/3/17 Date:   Date:     Direction  Parameters Parameters Parameters   Flexion  75%     Extension  25% w/ p     Rotation  R: 25% w/p  L: 25% w/p     Side bending  R: 25% w/p  L: 25% w/p     Hip R:WNL  L:WNL                   Strength Date:  7/3/17 Date:   Date:     Core/LE Parameters Parameters Parameters   Hip Flex R:3/5  L:3/5     Hip Ext R:3-/5  L:3-/5     Hip ABD R:3-/5  L:3-/5     Hip ADD R:3/5  L:3/5     Hip ER R:3/5  L:3/5     Hip IR R:3/5  L:3/5     Knee Ext R:4/5  L:4/5     Knee Flex R:3/5  L:3/5     Ankle DF R:4/5  L:4/5     Ankle PF R:4/5  L:4/5     Double Squat (L4) Able w/out UE A     Heel Walk (L5) Able w/out UE A     Toe Walk (S1) Able w/out UE A         Sensation: Normal and equal B throughout  Anterior Thigh (L3):  Medial Foot (L4):  Dorsal Foot (L5):  Lateral Foot (S1):    Special Test/Function:  Pelvic Obliquity: +; Pt with L anterior innominate rotation  Rotational Fault: FRSR @ L4 and L5  Accessory Mobility: Diminished L3-L5     CLINICAL PRESENTATION:   CLINICAL DECISION MAKING:   Outcome Measure:   LEFS: 59/80=74% Function; 26% Disability    Medical Necessity:   · Patient is expected to demonstrate progress in strength, range of motion, balance, coordination and functional technique to increase independence with returning to gym activities and classes safely. .  Reason for Services/Other Comments:  · Patient continues to require present interventions due to patient's inability to maintain neutral pelvic and lumbar alignment due to abdominal, hip and lumbar weakness. .            TREATMENT:     Pre-treatment Symptoms/Complaints: Pt c/o min R hip/SI soreness this am. She states she felt great until yesterday. She reports daily compliance with HEP. Pain: Initial:     1-2 Post Session:  0-1   MANUAL THERAPY: (10minutes): MET performed to correct pelvic obliquity  (L anterior innominate rotation) by engaging L HS and R quads. She also required gentle sacral and lumbar mobilizations (grade II and III) to correct mild rotational fault at L5 [facility]) and sacral torsion to the R.     THERAPEUTIC EXERCISE: (45 minutes):  Exercises per grid below to improve mobility and strength. Required moderate visual, verbal and tactile cues to promote proper body alignment, promote proper body posture and promote proper body breathing techniques. Progressed range and repetitions as indicated.      Date:  7/7/17 Date:  7/11/17 Date:   7/14/17 Date:  7/18/17 Date:  7/20/17    Activity/Exercise Parameters Parameters Parameters Parameters Parameters    Nu-Step  L4, 7min  L4, 10min L5, 11min    TAs 10 x 5 sec 10 x 5 sec 10 x 5 sec      TAs w/ add sq 10 x 5 sec 10 x 5 sec 10 x 5 sec      TAs w/ add sq and bridging 10 x 5 sec 10 x 5 sec 10 x 5 sec      LTR 20x 10x 10x W/ green ball, 15x B W/ green ball, 30x B    Bridging w/ TA and band RTB, 20x RTB/BTB, 30x total GTB, 20x W/green ball/triple threat,15x W/green ball/triple threat, 20x    clams  RTB, 2 x 15 B GTB, 2 x 10 GTB, 20x B GTB, 20x B    Firehydrants   15x B 20x B 20x B    Donkey kicks   15x B 20x B 20x B    Bird dog    15x B 20x B    Supine SL bridging     10x B    Supine Ankle taps (EO/IO)  2 x 15 B  20x B     SLR  2 x 15 B       Supine hip flexor stretch off mat  3 x 20sec B 3 x 20sec      Piriformis stretch (post) 3 x 20sec B 3 x 20sec B 3 x 30sec B      Mod piriformis stretch (ant) 3 x 20sec B        S/L hip flexor str w/ strap 3 x 20sec B        HS/GS str   3 x 30sec B      Resisted side-stepping  4L,RTB 4L, GTB 4L, GTB 4L, GTB    Resisted diagonals   2L ea, RTB 2L ea, GTB 4L, GTB 4L, GTB    Education Use of ice and NSAIDS HEP 2x/day; NSAIDS HEP 2x/day      MET for pelvic obliquity done done Done 4x today Not needed Done 2x    Manual correction for L4/5 rot done done Done Not needed Done 2x    Foam roll to B piriformis/TFL/ITB  6\" 6\" 6\" 4\"                        Treatment/Session Assessment: Pt with mild L anterior innominate rotation and L4-L5 rotational fault on entry today. She was easily corrected with MET. She fatigued very quickly with SL bridging due to glute/hip/core weakness. She still requires vc to control movements and avoid use of momentum. · Response to Treatment:  Pt did well with bird dog, donkey kicks, and fire hydrants, therefore I added those to her HEP. She subjectively reported little to no R hip pain at end of treatment. · Compliance with Program/Exercises: Pt reports compliance with HEP daily. · Recommendations/Intent for next treatment session: \"Next visit will focus on advancements to more challenging activities and reduction in assistance provided\".  Try golfer's  to increase core and hip strength while improving proprioception.     Future Appointments  Date Time Provider Nila Luz   7/25/2017 8:00 AM Orpha Failing, PT [facility]   7/25/2017 11:30 AM SFE [facility] ROOM 1 [facility]ER[facility]AM E   7/28/2017 8:00 AM Orpha Failing, PT [facility]   2/23/2018 8:00 AM CA[facility] LAB [facility] CA[facility]   3/2/2018 10:00 AM Neymar Gray MD [facility] CAF CA[facility]       Total Treatment Duration:PT Patient Time In/Time Out  Time In: 0800  Time Out: [address]  [facility], PT

## 2017-07-25 ENCOUNTER — HOSPITAL ENCOUNTER (OUTPATIENT)
Dept: MAMMOGRAPHY | Age: 42
Discharge: HOME | End: 2017-07-25
Attending: INTERNAL MEDICINE
Payer: COMMERCIAL

## 2017-07-25 ENCOUNTER — HOSPITAL ENCOUNTER (OUTPATIENT)
Dept: PHYSICAL THERAPY | Age: 42
Discharge: HOME | End: 2017-07-25
Attending: INTERNAL MEDICINE
Payer: COMMERCIAL

## 2017-07-25 DIAGNOSIS — Z12.31 ENCOUNTER FOR SCREENING MAMMOGRAM FOR BREAST CANCER: ICD-10-CM

## 2017-07-25 PROCEDURE — 77067 SCR MAMMO BI INCL CAD: CPT

## 2017-07-25 PROCEDURE — 97110 THERAPEUTIC EXERCISES: CPT | Performed by: PHYSICAL THERAPIST

## 2017-07-25 NOTE — PROGRESS NOTES
Torey Denis  : 1975 [address] [address] at [place] [address] [address] [facility] [address] [facility] [place], [facility], [address] [place] [address]  [phone]   [phone]         OUTPATIENT PHYSICAL THERAPY:Daily Note 2017    ICD-10: Treatment Diagnosis: (M 25.551) R hip pain, (M 54.5) LBP, and (M62.81) muscle weakness  Precautions/Allergies: Other plant, animal, environmental none  Fall Risk Score: 0 (? 5 = High Risk)  MD Orders: Eval and rectus diastesis MEDICAL/REFERRING DIAGNOSIS:  Separation of muscle (nontraumatic), other site [M62.08]   DATE OF ONSET: 7/3/17  REFERRING PHYSICIAN: Brian Moreno MD  RETURN PHYSICIAN APPOINTMENT: No f/u scheduled     INITIAL ASSESSMENT:  Ms. Uzma Estrada presents today with signs and symptoms consistent with the diagnosis of rectus diastesis secondary to pregnancy resulting in LBP and R hip pain from weak abdominals and hip musculature. She also presents with a forward L innominate rotation and rotational fault at L4-L5 due to core instability. Pt will benefit from skilled PT to address the following deficits. PROBLEM LIST (Impacting functional limitations):  1. Decreased Strength  2. Decreased ADL/Functional Activities  3. Decreased Transfer Abilities  4. Decreased Ambulation Ability/Technique  5. Decreased Balance  6. Increased Pain  7. Decreased Flexibility/Joint Mobility INTERVENTIONS PLANNED:  1. Balance Exercise  2. Bed Mobility  3. Cold  4. Cryotherapy  5. Electrical Stimulation  6. Heat  7. Home Exercise Program (HEP)  8. Manual Therapy  9. Neuromuscular Re-education/Strengthening  10. Range of Motion (ROM)  11. Therapeutic Exercise/Strengthening  12. Transfer Training   TREATMENT PLAN:  Effective Dates: 7/3/17 TO 9/3/17. Frequency/Duration: 2 times a week for 8 weeks  GOALS: (Goals have been discussed and agreed upon with patient.)  Short-Term Functional Goals: Time Frame: 4-6 weeks  1.  Pt will be compliant and independent with HEP, log rolling, car transfers and demonstrating correct, erect posture without an increase in back or hip pain. 2. Pt will improve LEFS score to 65/80 to increase pt's overall functional mobility. 3. Pt to subjectively report a decrease in pain to 1-2/10 @ worst to increase pt's ease with turning over in bed, and car transfers. 4. Pt will improve Lumbar AROM to Ext: 50% and B SB/ROT to 50% to increase pt's overall trunk mobility. 5. Pt will improve abdominal and B hip strength to > 3 to 3+/5 to allow pt to maintain neutral pelvic and lumbar alignment. Discharge Goals: Time Frame: 6-8 weeks  1. Pt will improve LEFS score to > 70/80 to increase pt's overall functional mobility. 2. Bed mobility and car transfers will be painfree. 3. Pt will improve Lumbar AROM to 75% of normal motion throughout with pain 1-2/10 at worst to increase pt's overall functional mobility. 4. Pt will return to gym activities, as well as, yoga/pilates maintaining normal pelvic and lumbar alignment with no incerase in LBP. 5. Pt will be discharged from PT to [facility]. Rehabilitation Potential For Stated Goals: Good  Regarding Emeterio Ahuja's therapy, I certify that the treatment plan above will be carried out by a therapist or under their direction. Thank you for this referral,  Bianca Meyers PT               The information in this section was collected on 7/3/17 (except where otherwise noted). HISTORY:   History of Present Injury/Illness (Reason for Referral):  Pt reports she's had LBP and abdominal soreness off and on for the last 4 years since her second child was born. She suffered a rectus diastesis with him. 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 She states she feels when she exercises, her back and hip pain increase.    Past Medical History/Comorbidities:   Ms. Maria M Anderson  has a past medical history of Abnormal Papanicolaou smear of cervix; Migraines; and Ovarian cyst.  Ms. Guajardo [place]  has a past surgical history that includes appendectomy;  section (2013); pelvic laparoscopy; cholecystectomy (2013); and colposcopy. Social History/Living Environment:     Pt lives in a 2 story home with her  and 2 children. She denies c/o LBP or hip pain with ascending/descending stairs. Prior Level of Function/Work/Activity:  Pt denies having rectus diastesis, abdominal weakness or LBP prior to her second pregnancy 4 years ago. Current Medications:       Current Outpatient Prescriptions:     butalbital-acetaminophen-caffeine (FIORICET, ESGIC) -40 mg per tablet, Take 1 Tab by mouth every six (6) hours as needed for Pain. Max Daily Amount: 4 Tabs., Disp: 20 Tab, Rfl: 3    loratadine (CLARITIN) 10 mg tablet, Take 10 mg by mouth daily as needed for Allergies. , Disp: , Rfl:    Date Last Reviewed:  7/3/17   EXAMINATION:   Observation/Orthostatic Postural Assessment:          Pt stands with increased weight through L LE off-weighting R. Pt denies c/o R side LBP or R hip pain with standing today. Palpation: pt minimally tender with palpation at R side L4-L5.   Posture/Deformity:  Lumbar AROM: % of normal motion in standing    Lumbar spine Date:  7/3/17 Date:   Date:     Direction  Parameters Parameters Parameters   Flexion  75%     Extension  25% w/ p     Rotation  R: 25% w/p  L: 25% w/p     Side bending  R: 25% w/p  L: 25% w/p     Hip R:WNL  L:WNL                   Strength Date:  7/3/17 Date:   Date:     Core/LE Parameters Parameters Parameters   Hip Flex R:3/5  L:3/5     Hip Ext R:3-/5  L:3-/5     Hip ABD R:3-/5  L:3-/5     Hip ADD R:3/5  L:3/5     Hip ER R:3/5  L:3/5     Hip IR R:3/5  L:3/5     Knee Ext R:4/5  L:4/5     Knee Flex R:3/5  L:3/5     Ankle DF R:4/5  L:4/5     Ankle PF R:4/5  L:4/5     Double Squat (L4) Able w/out UE A     Heel Walk (L5) Able w/out UE A     Toe Walk (S1) Able w/out UE A         Sensation: Normal and equal B throughout  Anterior Thigh (L3):  Medial Foot (L4):  Dorsal Foot (L5):  Lateral Foot (S1):    Special Test/Function:  Pelvic Obliquity: +; Pt with L anterior innominate rotation  Rotational Fault: FRSR @ L4 and L5  Accessory Mobility: Diminished L3-L5     CLINICAL PRESENTATION:   CLINICAL DECISION MAKING:   Outcome Measure:   LEFS: 59/80=74% Function; 26% Disability    Medical Necessity:   · Patient is expected to demonstrate progress in strength, range of motion, balance, coordination and functional technique to increase independence with returning to gym activities and classes safely. .  Reason for Services/Other Comments:  · Patient continues to require present interventions due to patient's inability to maintain neutral pelvic and lumbar alignment due to abdominal, hip and lumbar weakness. .            TREATMENT:     Pre-treatment Symptoms/Complaints: Pt denies c/o hip or LBP today. She reports compliance with HEP 2x since her last PT visit. Pain: Initial:     0 Post Session:  0   MANUAL THERAPY: (0minutes): MET performed to correct pelvic obliquity  (L anterior innominate rotation) by engaging L HS and R quads. She also required gentle sacral and lumbar mobilizations (grade II and III) to correct mild rotational fault at L5 [facility]) and sacral torsion to the R. ---------------------------------------------------------------------------------------------------------Omitted Today    THERAPEUTIC EXERCISE: (60 minutes):  Exercises per grid below to improve mobility and strength. Required moderate visual, verbal and tactile cues to promote proper body alignment, promote proper body posture and promote proper body breathing techniques. Progressed range and repetitions as indicated.      Date:  7/7/17 Date:  7/11/17 Date:   7/14/17 Date:  7/18/17 Date:  7/20/17 Date:  7/25/17   Activity/Exercise Parameters Parameters Parameters Parameters Parameters Parameters   Nu-Step  L4, 7min  L4, 10min L5, 11min L5, 11min   TAs 10 x 5 sec 10 x 5 sec 10 x 5 sec      TAs w/ add sq 10 x 5 sec 10 x 5 sec 10 x 5 sec      TAs w/ add sq and bridging 10 x 5 sec 10 x 5 sec 10 x 5 sec      LTR 20x 10x 10x W/ green ball, 15x B W/ green ball, 30x B 30x   Bridging w/ TA and band RTB, 20x RTB/BTB, 30x total GTB, 20x W/green ball/triple threat,15x W/green ball/triple threat, 20x W/green ball/triple threat, 20x   clams  RTB, 2 x 15 B GTB, 2 x 10 GTB, 20x B GTB, 20x B GTB, 20x B   Firehydrants   15x B 20x B 20x B 30x B   Donkey kicks   15x B 20x B 20x B 30x B   Bird dog    15x B 20x B Orange tband, 20x B   Golfer's Pick-up      5#, 10x B   Supine SL bridging     10x B    Supine Ankle taps (EO/IO)  2 x 15 B  20x B     SLR  2 x 15 B       Supine hip flexor stretch off mat  3 x 20sec B 3 x 20sec      Piriformis stretch (post) 3 x 20sec B 3 x 20sec B 3 x 30sec B      Mod piriformis stretch (ant) 3 x 20sec B        S/L hip flexor str w/ strap 3 x 20sec B        HS/GS str   3 x 30sec B      Resisted side-stepping  4L,RTB 4L, GTB 4L, GTB 4L, GTB Knees bent, 2L   Resisted diagonals   2L ea, RTB 2L ea, GTB 4L, GTB 4L, GTB Knees bent, 2L   Education Use of ice and NSAIDS HEP 2x/day; NSAIDS HEP 2x/day      MET for pelvic obliquity done done Done 4x today Not needed Done 2x Not required   Manual correction for L4/5 rot done done Done Not needed Done 2x Not required   Foam roll to B piriformis/TFL/ITB  6\" 6\" 6\" 4\" 4\"   Sacral distraction Over ball      4\"              Treatment/Session Assessment: Pt with no pelvic obliquity or rotational fault detected on entry or exit today. · Response to Treatment:  Pt did well with advancement of above exercises with repetitions, positioning and resistance with only c/o muscle fatigue, no pain. · Compliance with Program/Exercises: Pt reports compliance with HEP 2x since last visit. · Recommendations/Intent for next treatment session:  \"Next visit will focus on advancements to more challenging activities and reduction in assistance provided\". Review exercises using theraball, and progress tband strengthening as pt is able.      Future Appointments  Date Time Provider Nila Natty   7/25/2017 11:30 AM [facility]   7/28/2017 8:00 AM Gautam Rubio, PT [facility]   8/4/2017 11:00 AM Gautam Rubio PT [facility]   2/23/2018 8:00 AM CA[facility] LAB [facility] CAF CAF   3/2/2018 10:00 AM Sandra Serrano MD [facility] CAF       Total Treatment Duration:PT Patient Time In/Time Out  Time In: 0800  Time Out: [address]  [facility],

## 2017-07-28 ENCOUNTER — HOSPITAL ENCOUNTER (OUTPATIENT)
Dept: PHYSICAL THERAPY | Age: 42
Discharge: HOME | End: 2017-07-28
Attending: INTERNAL MEDICINE
Payer: COMMERCIAL

## 2017-07-28 PROCEDURE — 97110 THERAPEUTIC EXERCISES: CPT | Performed by: PHYSICAL THERAPIST

## 2017-07-28 NOTE — PROGRESS NOTES
Jadyn Palacios  : 1975 [id number] [address] at [address] [place] [address], [facility], [address] [place] [address]  [phone]   [phone]         OUTPATIENT PHYSICAL THERAPY:Daily Note 2017    ICD-10: Treatment Diagnosis: (M 25.551) R hip pain, (M 54.5) LBP, and (M62.81) muscle weakness  Precautions/Allergies: Other plant, animal, environmental none  Fall Risk Score: 0 (? 5 = High Risk)  MD Orders: Eval and rectus diastesis MEDICAL/REFERRING DIAGNOSIS:  Separation of muscle (nontraumatic), other site [M62.08]   DATE OF ONSET: 7/3/17  REFERRING PHYSICIAN: Luanne River MD  RETURN PHYSICIAN APPOINTMENT: No f/u scheduled     INITIAL ASSESSMENT:  Ms. Ayaan Menezes presents today with signs and symptoms consistent with the diagnosis of rectus diastesis secondary to pregnancy resulting in LBP and R hip pain from weak abdominals and hip musculature. She also presents with a forward L innominate rotation and rotational fault at L4-L5 due to core instability. Pt will benefit from skilled PT to address the following deficits. PROBLEM LIST (Impacting functional limitations):  1. Decreased Strength  2. Decreased ADL/Functional Activities  3. Decreased Transfer Abilities  4. Decreased Ambulation Ability/Technique  5. Decreased Balance  6. Increased Pain  7. Decreased Flexibility/Joint Mobility INTERVENTIONS PLANNED:  1. Balance Exercise  2. Bed Mobility  3. Cold  4. Cryotherapy  5. Electrical Stimulation  6. Heat  7. Home Exercise Program (HEP)  8. Manual Therapy  9. Neuromuscular Re-education/Strengthening  10. Range of Motion (ROM)  11. Therapeutic Exercise/Strengthening  12. Transfer Training   TREATMENT PLAN:  Effective Dates: 7/3/17 TO 9/3/17. Frequency/Duration: 2 times a week for 8 weeks  GOALS: (Goals have been discussed and agreed upon with patient.)  Short-Term Functional Goals: Time Frame: 4-6 weeks  1.  Pt will be compliant and independent with HEP, log rolling, car transfers and demonstrating correct, erect posture without an increase in back or hip pain. 2. Pt will improve LEFS score to 65/80 to increase pt's overall functional mobility. 3. Pt to subjectively report a decrease in pain to 1-2/10 @ worst to increase pt's ease with turning over in bed, and car transfers. 4. Pt will improve Lumbar AROM to Ext: 50% and B SB/ROT to 50% to increase pt's overall trunk mobility. 5. Pt will improve abdominal and B hip strength to > 3 to 3+/5 to allow pt to maintain neutral pelvic and lumbar alignment. Discharge Goals: Time Frame: 6-8 weeks  1. Pt will improve LEFS score to > 70/80 to increase pt's overall functional mobility. 2. Bed mobility and car transfers will be painfree. 3. Pt will improve Lumbar AROM to 75% of normal motion throughout with pain 1-2/10 at worst to increase pt's overall functional mobility. 4. Pt will return to gym activities, as well as, yoga/pilates maintaining normal pelvic and lumbar alignment with no incerase in LBP. 5. Pt will be discharged from PT to [facility]. Rehabilitation Potential For Stated Goals: Good  Regarding Myron Ahuja's therapy, I certify that the treatment plan above will be carried out by a therapist or under their direction. Thank you for this referral,  Josselin Armando PT               The information in this section was collected on 7/3/17 (except where otherwise noted). HISTORY:   History of Present Injury/Illness (Reason for Referral):  Pt reports she's had LBP and abdominal soreness off and on for the last 4 years since her second child was born. She suffered a rectus diastesis with him. 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 She states she feels when she exercises, her back and hip pain increase.    Past Medical History/Comorbidities:   Ms. Noble Duckworth  has a past medical history of Abnormal Papanicolaou smear of cervix; Migraines; and Ovarian cyst.  Ms. Uzma Estrada  has a past surgical history that includes appendectomy;  section (2013); pelvic laparoscopy; cholecystectomy (2013); and colposcopy. Social History/Living Environment:     Pt lives in a 2 story home with her  and 2 children. She denies c/o LBP or hip pain with ascending/descending stairs. Prior Level of Function/Work/Activity:  Pt denies having rectus diastesis, abdominal weakness or LBP prior to her second pregnancy 4 years ago. Current Medications:       Current Outpatient Prescriptions:     butalbital-acetaminophen-caffeine (FIORICET, ESGIC) -40 mg per tablet, Take 1 Tab by mouth every six (6) hours as needed for Pain. Max Daily Amount: 4 Tabs., Disp: 20 Tab, Rfl: 3    loratadine (CLARITIN) 10 mg tablet, Take 10 mg by mouth daily as needed for Allergies. , Disp: , Rfl:    Date Last Reviewed:  7/3/17   EXAMINATION:   Observation/Orthostatic Postural Assessment:          Pt stands with increased weight through L LE off-weighting R. Pt denies c/o R side LBP or R hip pain with standing today. Palpation: pt minimally tender with palpation at R side L4-L5.   Posture/Deformity:  Lumbar AROM: % of normal motion in standing    Lumbar spine Date:  7/3/17 Date:   Date:     Direction  Parameters Parameters Parameters   Flexion  75%     Extension  25% w/ p     Rotation  R: 25% w/p  L: 25% w/p     Side bending  R: 25% w/p  L: 25% w/p     Hip R:WNL  L:WNL                   Strength Date:  7/3/17 Date:   Date:     Core/LE Parameters Parameters Parameters   Hip Flex R:3/5  L:3/5     Hip Ext R:3-/5  L:3-/5     Hip ABD R:3-/5  L:3-/5     Hip ADD R:3/5  L:3/5     Hip ER R:3/5  L:3/5     Hip IR R:3/5  L:3/5     Knee Ext R:4/5  L:4/5     Knee Flex R:3/5  L:3/5     Ankle DF R:4/5  L:4/5     Ankle PF R:4/5  L:4/5     Double Squat (L4) Able w/out UE A     Heel Walk (L5) Able w/out UE A     Toe Walk (S1) Able w/out UE A         Sensation: Normal and equal B throughout  Anterior Thigh (L3):  Medial Foot (L4):  Dorsal Foot (L5):  Lateral Foot (S1):    Special Test/Function:  Pelvic Obliquity: +; Pt with L anterior innominate rotation  Rotational Fault: FRSR @ L4 and L5  Accessory Mobility: Diminished L3-L5     CLINICAL PRESENTATION:   CLINICAL DECISION MAKING:   Outcome Measure:   LEFS: 59/80=74% Function; 26% Disability    Medical Necessity:   · Patient is expected to demonstrate progress in strength, range of motion, balance, coordination and functional technique to increase independence with returning to gym activities and classes safely. .  Reason for Services/Other Comments:  · Patient continues to require present interventions due to patient's inability to maintain neutral pelvic and lumbar alignment due to abdominal, hip and lumbar weakness. .            TREATMENT:     Pre-treatment Symptoms/Complaints: Pt denies hip or LB soreness or pain since last PT visit. She reports daily compliance with HEP. Pain: Initial:     0 Post Session:  0   MANUAL THERAPY: (0 minutes): MET performed to correct pelvic obliquity  (L anterior innominate rotation) by engaging L HS and R quads. She also required gentle sacral and lumbar mobilizations (grade II and III) to correct mild rotational fault at L5 [facility]) and sacral torsion to the R. ---------------------------------------------------------------------------------------------------------Omitted Today    THERAPEUTIC EXERCISE: (60 minutes):  Exercises per grid below to improve mobility and strength. Required moderate visual, verbal and tactile cues to promote proper body alignment, promote proper body posture and promote proper body breathing techniques. Progressed range and repetitions as indicated.      Date:  7/11/17 Date:   7/14/17 Date:  7/18/17 Date:  7/20/17 Date:  7/25/17 Date:  7/28/17    Activity/Exercise Parameters Parameters Parameters Parameters Parameters Parameters    Nu-Step L4, 7min  L4, 10min L5, 11min L5, 11min L4, 12min    TAs 10 x 5 sec 10 x 5 sec        TAs w/ add sq 10 x 5 sec 10 x 5 sec        TAs w/ add sq and bridging 10 x 5 sec 10 x 5 sec        LTR 10x 10x W/ green ball, 15x B W/ green ball, 30x B 30x 30x w/green ball    DKTC      30x w/green ball    Bridging w/ TA and band RTB/BTB, 30x total GTB, 20x W/green ball/triple threat,15x W/green ball/triple threat, 20x W/green ball/triple threat, 20x W/green ball triple threat, 30x    clams RTB, 2 x 15 B GTB, 2 x 10 GTB, 20x B GTB, 20x B GTB, 20x B GTB, 20x B    Firehydrants  15x B 20x B 20x B 30x B 30x B    Donkey kicks  15x B 20x B 20x B 30x B 30x B    Bird dog   15x B 20x B Orange tband, 20x B     Golfer's Pick-up     5#, 10x B 5#, 15x B    Supine SL bridging    10x B      Supine Ankle taps (EO/IO) 2 x 15 B  20x B       SLR 2 x 15 B         Supine hip flexor stretch off mat 3 x 20sec B 3 x 20sec        Piriformis stretch (post) 3 x 20sec B 3 x 30sec B        Mod piriformis stretch (ant)          S/L hip flexor str w/ strap          HS/GS str  3 x 30sec B    3 x 30sec    Resisted side-stepping 4L,RTB 4L, GTB 4L, GTB 4L, GTB Knees bent, 2L Knees bent, 4#/GTB, 4L    Resisted diagonals  2L ea, RTB 2L ea, GTB 4L, GTB 4L, GTB Knees bent, 2L Knees bent, 4#/GTB, 4L    Education HEP 2x/day; NSAIDS HEP 2x/day    HEP 2x/day    MET for pelvic obliquity done Done 4x today Not needed Done 2x Not required Not req    Manual correction for L4/5 rot done Done Not needed Done 2x Not required Done    Foam roll to B piriformis/TFL/ITB 6\" 6\" 6\" 4\" 4\" 4\"    Sacral distraction Over ball     4\"                 Treatment/Session Assessment: Pt with no pelvic obliquity and only minimal rotational fault today. She was easily corrected. HS tightness noted today. Pt is prorgessing nicely toward current goals.    · Response to Treatment:  Pt was challenged by theraball activities, as well as, resisted side-stepping/diagonals holding a 4# ball due to residual core/hip weakness and poor muscle endurance. · Compliance with Program/Exercises: Pt reports compliance with HEP 1x/day since last visit. · Recommendations/Intent for next treatment session:  Review HEP and answer questions as pt may be DC'd at that time.     Future Appointments  Date Time Provider Nila Luz   8/3/2017 10:40 AM SFE [facility] BI ROOM 2 SFERMAM SFE   8/3/2017 11:00 AM SFE [facility] BI GE LOGIC S8 US UNIT 1 [facility]   8/4/2017 11:00 AM Orville Bhatti, MARIAM [facility]PT [facility]   2/23/2018 8:00 AM CAFM LAB SSA CAFM CAFM   3/2/2018 10:00 AM Lan Ayers MD [facility] CAFM CAFM       Total Treatment Duration:PT Patient Time In/Time Out  Time In: 0800  Time Out: 0900    Orville Bhatti, PT

## 2017-08-03 ENCOUNTER — HOSPITAL ENCOUNTER (OUTPATIENT)
Dept: MAMMOGRAPHY | Age: 42
Discharge: HOME | End: 2017-08-03
Attending: INTERNAL MEDICINE
Payer: COMMERCIAL

## 2017-08-03 DIAGNOSIS — R92.8 ABNORMAL SCREENING MAMMOGRAM: ICD-10-CM

## 2017-08-03 PROCEDURE — 77065 DX MAMMO INCL CAD UNI: CPT

## 2017-08-03 PROCEDURE — 76642 ULTRASOUND BREAST LIMITED: CPT

## 2017-08-16 ENCOUNTER — HOSPITAL ENCOUNTER (OUTPATIENT)
Dept: PHYSICAL THERAPY | Age: 42
Discharge: HOME | End: 2017-08-16
Attending: INTERNAL MEDICINE
Payer: COMMERCIAL

## 2017-08-16 PROCEDURE — 97110 THERAPEUTIC EXERCISES: CPT | Performed by: PHYSICAL THERAPIST

## 2017-08-16 NOTE — PROGRESS NOTES
Dawson Wood  : 1975 [address] at [place] [address] [address] [place] [address], [facility], [address] [place] [address]  [phone]   [phone]         OUTPATIENT PHYSICAL THERAPY:Discharge 2017    ICD-10: Treatment Diagnosis: (M 25.551) R hip pain, (M 54.5) LBP, and (M62.81) muscle weakness  Precautions/Allergies: Other plant, animal, environmental none  Fall Risk Score: 0 (? 5 = High Risk)  MD Orders: Eval and rectus diastesis MEDICAL/REFERRING DIAGNOSIS:  Separation of muscle (nontraumatic), other site [M62.08]   DATE OF ONSET: 7/3/17  REFERRING PHYSICIAN: Tyson Hannah MD  RETURN PHYSICIAN APPOINTMENT: No f/u scheduled     ASSESSMENT:  Ms. Tanika Miller has done well with PT, meeting all short and long term goals listed below. She is maintaining neutral pelvic and lumbar alignment consistently. Her core and B hip strength has improved tremendously, and she is compliant and independent with her HEP. Please see detailed improvements below. Pt no longer has LBP or hip pain. No further PT needs at this time. TREATMENT PLAN:  Effective Dates: 7/3/17 TO 9/3/17. Frequency/Duration: 2 times a week for 8 weeks  GOALS: (Goals have been discussed and agreed upon with patient.)  Short-Term Functional Goals: Time Frame: 4-6 weeks  1. Pt will be compliant and independent with HEP, log rolling, car transfers and demonstrating correct, erect posture without an increase in back or hip pain.------------------MET  2. Pt will improve LEFS score to 65/80 to increase pt's overall functional mobility.------------------------------------------------------------------------------------------------------------------------------MET, pt scored 80/80. 3. Pt to subjectively report a decrease in pain to 1-2/10 @ worst to increase pt's ease with turning over in bed, and car transfers. ---------------------------------------------------------MET; 0 pain  4.  Pt will improve Lumbar AROM to Ext: 50% and B SB/ROT to 50% to increase pt's overall trunk mobility.----------------------------------------------------------MET, see below  5. Pt will improve abdominal and B hip strength to > 3 to 3+/5 to allow pt to maintain neutral pelvic and lumbar alignment.---------------------------------------MET, see below  Discharge Goals: Time Frame: 6-8 weeks  1. Pt will improve LEFS score to > 70/80 to increase pt's overall functional mobility. -----------------------------------------------------------------------------------------MET, pt scored 80/80  2. Bed mobility and car transfers will be painfree.-------------------------------------------------------------------------------------------------------------------------------------------------------------------MET  3. Pt will improve Lumbar AROM to 75% of normal motion throughout with pain 1-2/10 at worst to increase pt's overall functional mobility.------------------------------------MET  4. Pt will return to gym activities, as well as, yoga/pilates maintaining normal pelvic and lumbar alignment with no incerase in LBP. ------------------------------------------MET  5. Pt will be discharged from PT to HEP.-----------------------------------------------------------------------------------------------------------------------------------------------------------------------------MET    Rehabilitation Potential For Stated Goals: Good  Regarding Jimmy Spurling Stone's therapy, I certify that the treatment plan above will be carried out by a therapist or under their direction. Thank you for this referral,  Scotty Michaud PT               The information in this section was collected on 7/3/17 (except where otherwise noted). HISTORY:   EXAMINATION:   Observation/Orthostatic Postural Assessment:          Pt stands with increased weight through L LE off-weighting R. Pt denies c/o R side LBP or R hip pain with standing today.    Palpation: pt minimally tender with palpation at R side L4-L5. Posture/Deformity:  Lumbar AROM: % of normal motion in standing    Lumbar spine Date:  7/3/17 Date:  8/16/17 Date:     Direction  Parameters Parameters Parameters   Flexion  75% 85%    Extension  25% w/ p 75%, no pain    Rotation  R: 25% w/p  L: 25% w/p 75% B no pain    Side bending  R: 25% w/p  L: 25% w/p 75% B  No pain    Hip R:WNL  L:WNL WNL B                  Strength Date:  7/3/17 Date:  8/16/17 Date:     Core/LE Parameters 3+/5 Parameters   Hip Flex R:3/5  L:3/5 4/5 B    Hip Ext R:3-/5  L:3-/5 3+/5 B    Hip ABD R:3-/5  L:3-/5 4/5 B    Hip ADD R:3/5  L:3/5 4+/5 B    Hip ER R:3/5  L:3/5 4/5 B    Hip IR R:3/5  L:3/5 4/5 B    Knee Ext R:4/5  L:4/5 4+/5 B    Knee Flex R:3/5  L:3/5 4+/5 B    Ankle DF R:4/5  L:4/5     Ankle PF R:4/5  L:4/5     Double Squat (L4) Able w/out UE A     Heel Walk (L5) Able w/out UE A     Toe Walk (S1) Able w/out UE A          CLINICAL PRESENTATION:   CLINICAL DECISION MAKING:   Outcome Measure:   LEFS: 59/80=74% Function; 26% Disability (7/3/17)  LEFS: 80/52=976% Function (8/16/17)    Medical Necessity:   · Patient is expected to demonstrate progress in strength, range of motion, balance, coordination and functional technique to increase independence with returning to gym activities and classes safely. .  Reason for Services/Other Comments:  · Patient continues to require present interventions due to patient's inability to maintain neutral pelvic and lumbar alignment due to abdominal, hip and lumbar weakness. .            TREATMENT:     Pre-treatment Symptoms/Complaints: Pt reports she hasn't been to PT x 2.5 weeks due to being OOT unexpectedly. Pain: Initial:     0 Post Session:  0   MANUAL THERAPY: (0 minutes): MET performed to correct pelvic obliquity  (L anterior innominate rotation) by engaging L HS and R quads.  She also required gentle sacral and lumbar mobilizations (grade II and III) to correct mild rotational fault at L5 [facility]) and sacral torsion to the R. ---------------------------------------------------------------------------------------------------------Omitted Today    THERAPEUTIC EXERCISE: (45 minutes):  Exercises per grid below to improve mobility and strength. Required moderate visual, verbal and tactile cues to promote proper body alignment, promote proper body posture and promote proper body breathing techniques. Progressed range and repetitions as indicated.      Date:  7/11/17 Date:   7/14/17 Date:  7/18/17 Date:  7/20/17 Date:  7/25/17 Date:  7/28/17 Date:  8/16/17   Activity/Exercise Parameters Parameters Parameters Parameters Parameters Parameters Parameters   Nu-Step L4, 7min  L4, 10min L5, 11min L5, 11min L4, 12min L4, 8min   TAs 10 x 5 sec 10 x 5 sec        TAs w/ add sq 10 x 5 sec 10 x 5 sec        TAs w/ add sq and bridging 10 x 5 sec 10 x 5 sec        LTR 10x 10x W/ green ball, 15x B W/ green ball, 30x B 30x 30x w/green ball 30x yellow ball   DKTC      30x w/green ball 30x yellow ball   Bridging w/ TA and band RTB/BTB, 30x total GTB, 20x W/green ball/triple threat,15x W/green ball/triple threat, 20x W/green ball/triple threat, 20x W/green ball triple threat, 30x 30x yellow ball   clams RTB, 2 x 15 B GTB, 2 x 10 GTB, 20x B GTB, 20x B GTB, 20x B GTB, 20x B    Firehydrants  15x B 20x B 20x B 30x B 30x B 20x B   Donkey kicks  15x B 20x B 20x B 30x B 30x B 20x B   Bird dog   15x B 20x B Orange tband, 20x B  20x B   Golfer's Pick-up     5#, 10x B 5#, 15x B    Supine SL bridging    10x B      Supine Ankle taps (EO/IO) 2 x 15 B  20x B       SLR 2 x 15 B         Supine hip flexor stretch off mat 3 x 20sec B 3 x 20sec        Piriformis stretch (post) 3 x 20sec B 3 x 30sec B        Mod piriformis stretch (ant)          S/L hip flexor str w/ strap          HS/GS str  3 x 30sec B    3 x 30sec    Resisted side-stepping 4L,RTB 4L, GTB 4L, GTB 4L, GTB Knees bent, 2L Knees bent, 4#/GTB, 4L GTB, 4L   Resisted diagonals  2L ea, RTB 2L ea, GTB 4L, GTB 4L, GTB Knees bent, 2L Knees bent, 4#/GTB, 4L GTB, 4L   Education HEP 2x/day; NSAIDS HEP 2x/day    HEP 2x/day HEP 2x/day   MET for pelvic obliquity done Done 4x today Not needed Done 2x Not required Not req 1 round   Manual correction for L4/5 rot done Done Not needed Done 2x Not required Done Not req   Foam roll to B piriformis/TFL/ITB 6\" 6\" 6\" 4\" 4\" 4\" 4\"   Sacral distraction Over ball     4\"                 Treatment/Session Assessment: Pt with only mild pelvic obliquity and no LBP or R hip pain. She did well with above exercises with only c/o fatigue after several repetitions. She has met all short and long term goals listed above. · Response to Treatment:  Pt with no c/o LBP or hip pain, and was able to perform above exercises with good form and control. · Compliance with Program/Exercises: Pt reports compliance with HEP 3x /week since last appointment. · Recommendations: Pt is DC'd from PT to HEP at this time.      Future Appointments  Date Time Provider Nila Luz   2/23/2018 8:00 AM CAFM LAB SSA [facility] CAF   3/2/2018 10:00 AM MD Beena Cervantes [facility]       Total Treatment Duration:PT Patient Time In/Time Out  Time In: 1300  Time Out: [address] [facility] [address], PT

## 2017-12-26 ENCOUNTER — ANESTHESIA EVENT (OUTPATIENT)
Dept: SURGERY | Age: 42
End: 2017-12-26
Payer: COMMERCIAL

## 2017-12-26 PROBLEM — O02.1 MISSED ABORTION: Status: ACTIVE | Noted: 2017-12-26

## 2017-12-26 NOTE — H&P
Ronald Badillo  is a 38 y.o. female, . Patient's last menstrual period was 10/13/2017., who is being seen for Decreasing Quant levels. Patient has had cramping, no bleeding. Her us today shows retroverted uterus with gestational sac of 9w5d size. Questionable fetal pole measuring 6w2d with no fca. Gestational sac collapsed with hemorrhage seen. Quant:  2017 - [id number]  No Progesterone Drawn on this day. 2017 - [id number]  Progesterone - 14.5  2017 - [id number]  Progesterone - 15.9        HISTORY:  Sexual History:   History   Sexual Activity    Sexual activity: Yes    Partners: Male    Birth control/ protection: None        Current Outpatient Prescriptions on File Prior to Visit   Medication Sig Dispense Refill    progesterone (PROMETRIUM) 100 mg capsule Insert 1 Cap into vagina two (2) times a day. 30 Cap 3    loratadine (CLARITIN) 10 mg tablet Take 10 mg by mouth daily as needed for Allergies. No current facility-administered medications on file prior to visit. Ronald Badillo  has a past medical history of Abnormal Papanicolaou smear of cervix; Cystocele; Diverticulitis; Migraines; Miscarriage (2017); Ovarian cyst; SAB (spontaneous ); and Vaginal delivery. .    She  has a past surgical history that includes hx  section (2013); hx pelvic laparoscopy; hx cholecystectomy (2013); hx colposcopy; and hx other surgical.    Her current meds are:    Current Outpatient Prescriptions:     HYDROcodone-acetaminophen (NORCO) 5-325 mg per tablet, Take 1 Tab by mouth., Disp: , Rfl:     amoxicillin-clavulanate (AUGMENTIN) 875-125 mg per tablet, Take  by mouth every twelve (12) hours. , Disp: , Rfl:     progesterone (PROMETRIUM) 100 mg capsule, Insert 1 Cap into vagina two (2) times a day., Disp: 30 Cap, Rfl: 3    loratadine (CLARITIN) 10 mg tablet, Take 10 mg by mouth daily as needed for Allergies. , Disp: , Rfl:      Her family history includes Cancer in her mother; Migraines in her mother; Stomach Cancer in her maternal uncle; Thyroid Disease in her mother. ROS   Feeling well. No dyspnea or chest pain on exertion. No abdominal pain, change in bowel habits, black or bloody stools. PHYSICAL EXAM:  Visit Vitals    BP 90/50    Wt 146 lb (66.2 kg)    LMP 10/13/2017    BMI 24.3 kg/m2     The patient appears well, alert, oriented x 3, in no distress. Heart:  RRR without murmurs, rubs or gallops  Lungs cta b&s   Abdomen: Non tender  Pelvic: deferred    ASSESSMENT:  Encounter Diagnosis   Name Primary?  Threatened  in early pregnancy Yes   Missed     PLAN:  Diagnosis explained in detail, including differential.  Discussed expectant management, cytotec use at home and suction D&C with risks and benefits of all discussed. Pt would like to proceed with suction D&C. Orders Placed This Encounter    Transvaginal     Order Specific Question:   Is Patient Pregnant? Answer: Yes    HYDROcodone-acetaminophen (NORCO) 5-325 mg per tablet     Sig: Take 1 Tab by mouth.  amoxicillin-clavulanate (AUGMENTIN) 875-125 mg per tablet     Sig: Take  by mouth every twelve (12) hours.        Naren Larson Alt, DO

## 2017-12-27 ENCOUNTER — ANESTHESIA (OUTPATIENT)
Dept: SURGERY | Age: 42
End: 2017-12-27
Payer: COMMERCIAL

## 2017-12-27 ENCOUNTER — HOSPITAL ENCOUNTER (OUTPATIENT)
Age: 42
Setting detail: OUTPATIENT SURGERY
Discharge: HOME | End: 2017-12-27
Attending: OBSTETRICS & GYNECOLOGY | Admitting: OBSTETRICS & GYNECOLOGY
Payer: COMMERCIAL

## 2017-12-27 VITALS
BODY MASS INDEX: 23.82 KG/M2 | RESPIRATION RATE: 18 BRPM | HEIGHT: 65 IN | WEIGHT: 143 LBS | HEART RATE: 61 BPM | SYSTOLIC BLOOD PRESSURE: 92 MMHG | DIASTOLIC BLOOD PRESSURE: 54 MMHG | TEMPERATURE: 99.2 F | OXYGEN SATURATION: 100 %

## 2017-12-27 DIAGNOSIS — O02.1 MISSED ABORTION: Primary | ICD-10-CM

## 2017-12-27 PROCEDURE — 76060000032 HC ANESTHESIA 0.5 TO 1 HR: Performed by: OBSTETRICS & GYNECOLOGY

## 2017-12-27 PROCEDURE — 76210000020 HC REC RM PH II FIRST 0.5 HR: Performed by: OBSTETRICS & GYNECOLOGY

## 2017-12-27 PROCEDURE — 77030008579 HC TBNG UTER SUC CARD -A: Performed by: OBSTETRICS & GYNECOLOGY

## 2017-12-27 PROCEDURE — 77030020143 HC AIRWY LARYN INTUB CGAS -A: Performed by: ANESTHESIOLOGY

## 2017-12-27 PROCEDURE — 77030012317 HC CATH URET INT COVD -A: Performed by: OBSTETRICS & GYNECOLOGY

## 2017-12-27 PROCEDURE — 74011250636 HC RX REV CODE- 250/636

## 2017-12-27 PROCEDURE — 77030020164: Performed by: OBSTETRICS & GYNECOLOGY

## 2017-12-27 PROCEDURE — 74011250636 HC RX REV CODE- 250/636: Performed by: ANESTHESIOLOGY

## 2017-12-27 PROCEDURE — 76010000154 HC OR TIME FIRST 0.5 HR: Performed by: OBSTETRICS & GYNECOLOGY

## 2017-12-27 PROCEDURE — 74011000250 HC RX REV CODE- 250

## 2017-12-27 PROCEDURE — 88305 TISSUE EXAM BY PATHOLOGIST: CPT | Performed by: OBSTETRICS & GYNECOLOGY

## 2017-12-27 PROCEDURE — 77030018836 HC SOL IRR NACL ICUM -A: Performed by: OBSTETRICS & GYNECOLOGY

## 2017-12-27 PROCEDURE — 77030011640 HC PAD GRND REM COVD -A: Performed by: OBSTETRICS & GYNECOLOGY

## 2017-12-27 PROCEDURE — 76210000016 HC OR PH I REC 1 TO 1.5 HR: Performed by: OBSTETRICS & GYNECOLOGY

## 2017-12-27 PROCEDURE — 74011000250 HC RX REV CODE- 250: Performed by: ANESTHESIOLOGY

## 2017-12-27 RX ORDER — SODIUM CHLORIDE, SODIUM LACTATE, POTASSIUM CHLORIDE, CALCIUM CHLORIDE 600; 310; 30; 20 MG/100ML; MG/100ML; MG/100ML; MG/100ML
75 INJECTION, SOLUTION INTRAVENOUS CONTINUOUS
Status: DISCONTINUED | OUTPATIENT
Start: 2017-12-27 | End: 2017-12-27 | Stop reason: HOSPADM

## 2017-12-27 RX ORDER — FENTANYL CITRATE 50 UG/ML
100 INJECTION, SOLUTION INTRAMUSCULAR; INTRAVENOUS ONCE
Status: DISCONTINUED | OUTPATIENT
Start: 2017-12-27 | End: 2017-12-27 | Stop reason: HOSPADM

## 2017-12-27 RX ORDER — SODIUM CHLORIDE, SODIUM LACTATE, POTASSIUM CHLORIDE, CALCIUM CHLORIDE 600; 310; 30; 20 MG/100ML; MG/100ML; MG/100ML; MG/100ML
50 INJECTION, SOLUTION INTRAVENOUS CONTINUOUS
Status: DISCONTINUED | OUTPATIENT
Start: 2017-12-27 | End: 2017-12-27 | Stop reason: HOSPADM

## 2017-12-27 RX ORDER — LIDOCAINE HYDROCHLORIDE 10 MG/ML
0.1 INJECTION INFILTRATION; PERINEURAL AS NEEDED
Status: DISCONTINUED | OUTPATIENT
Start: 2017-12-27 | End: 2017-12-27 | Stop reason: HOSPADM

## 2017-12-27 RX ORDER — MIDAZOLAM HYDROCHLORIDE 1 MG/ML
2 INJECTION, SOLUTION INTRAMUSCULAR; INTRAVENOUS ONCE
Status: COMPLETED | OUTPATIENT
Start: 2017-12-27 | End: 2017-12-27

## 2017-12-27 RX ORDER — SODIUM CHLORIDE 0.9 % (FLUSH) 0.9 %
5-10 SYRINGE (ML) INJECTION AS NEEDED
Status: DISCONTINUED | OUTPATIENT
Start: 2017-12-27 | End: 2017-12-27 | Stop reason: HOSPADM

## 2017-12-27 RX ORDER — MIDAZOLAM HYDROCHLORIDE 1 MG/ML
2 INJECTION, SOLUTION INTRAMUSCULAR; INTRAVENOUS
Status: DISCONTINUED | OUTPATIENT
Start: 2017-12-27 | End: 2017-12-27 | Stop reason: HOSPADM

## 2017-12-27 RX ORDER — FENTANYL CITRATE 50 UG/ML
INJECTION, SOLUTION INTRAMUSCULAR; INTRAVENOUS AS NEEDED
Status: DISCONTINUED | OUTPATIENT
Start: 2017-12-27 | End: 2017-12-27 | Stop reason: HOSPADM

## 2017-12-27 RX ORDER — DEXAMETHASONE SODIUM PHOSPHATE 4 MG/ML
INJECTION, SOLUTION INTRA-ARTICULAR; INTRALESIONAL; INTRAMUSCULAR; INTRAVENOUS; SOFT TISSUE AS NEEDED
Status: DISCONTINUED | OUTPATIENT
Start: 2017-12-27 | End: 2017-12-27 | Stop reason: HOSPADM

## 2017-12-27 RX ORDER — HYDROMORPHONE HYDROCHLORIDE 2 MG/ML
0.5 INJECTION, SOLUTION INTRAMUSCULAR; INTRAVENOUS; SUBCUTANEOUS
Status: DISCONTINUED | OUTPATIENT
Start: 2017-12-27 | End: 2017-12-27 | Stop reason: HOSPADM

## 2017-12-27 RX ORDER — ONDANSETRON 2 MG/ML
INJECTION INTRAMUSCULAR; INTRAVENOUS AS NEEDED
Status: DISCONTINUED | OUTPATIENT
Start: 2017-12-27 | End: 2017-12-27 | Stop reason: HOSPADM

## 2017-12-27 RX ORDER — OXYCODONE HYDROCHLORIDE 5 MG/1
5 TABLET ORAL
Status: DISCONTINUED | OUTPATIENT
Start: 2017-12-27 | End: 2017-12-27 | Stop reason: HOSPADM

## 2017-12-27 RX ORDER — EPHEDRINE SULFATE 50 MG/ML
INJECTION, SOLUTION INTRAVENOUS AS NEEDED
Status: DISCONTINUED | OUTPATIENT
Start: 2017-12-27 | End: 2017-12-27 | Stop reason: HOSPADM

## 2017-12-27 RX ORDER — OXYCODONE HYDROCHLORIDE 5 MG/1
5 TABLET ORAL
Qty: 10 TAB | Refills: 0 | Status: SHIPPED | OUTPATIENT
Start: 2017-12-27 | End: 2018-03-02

## 2017-12-27 RX ORDER — PROPOFOL 10 MG/ML
INJECTION, EMULSION INTRAVENOUS AS NEEDED
Status: DISCONTINUED | OUTPATIENT
Start: 2017-12-27 | End: 2017-12-27 | Stop reason: HOSPADM

## 2017-12-27 RX ORDER — ALBUTEROL SULFATE 0.83 MG/ML
2.5 SOLUTION RESPIRATORY (INHALATION) AS NEEDED
Status: DISCONTINUED | OUTPATIENT
Start: 2017-12-27 | End: 2017-12-27 | Stop reason: HOSPADM

## 2017-12-27 RX ORDER — LIDOCAINE HYDROCHLORIDE 20 MG/ML
INJECTION, SOLUTION EPIDURAL; INFILTRATION; INTRACAUDAL; PERINEURAL AS NEEDED
Status: DISCONTINUED | OUTPATIENT
Start: 2017-12-27 | End: 2017-12-27 | Stop reason: HOSPADM

## 2017-12-27 RX ORDER — SODIUM CHLORIDE 0.9 % (FLUSH) 0.9 %
5-10 SYRINGE (ML) INJECTION EVERY 8 HOURS
Status: DISCONTINUED | OUTPATIENT
Start: 2017-12-27 | End: 2017-12-27 | Stop reason: HOSPADM

## 2017-12-27 RX ORDER — METHYLERGONOVINE MALEATE 0.2 MG/ML
0.2 INJECTION INTRAVENOUS ONCE
Status: DISCONTINUED | OUTPATIENT
Start: 2017-12-27 | End: 2017-12-27 | Stop reason: HOSPADM

## 2017-12-27 RX ORDER — CELECOXIB 200 MG/1
200 CAPSULE ORAL
Status: DISCONTINUED | OUTPATIENT
Start: 2017-12-27 | End: 2017-12-27 | Stop reason: HOSPADM

## 2017-12-27 RX ADMIN — DEXAMETHASONE SODIUM PHOSPHATE 5 MG: 4 INJECTION, SOLUTION INTRA-ARTICULAR; INTRALESIONAL; INTRAMUSCULAR; INTRAVENOUS; SOFT TISSUE at 09:27

## 2017-12-27 RX ADMIN — LIDOCAINE HYDROCHLORIDE 40 MG: 20 INJECTION, SOLUTION EPIDURAL; INFILTRATION; INTRACAUDAL; PERINEURAL at 09:10

## 2017-12-27 RX ADMIN — FENTANYL CITRATE 25 MCG: 50 INJECTION, SOLUTION INTRAMUSCULAR; INTRAVENOUS at 09:28

## 2017-12-27 RX ADMIN — LIDOCAINE HYDROCHLORIDE 60 MG: 20 INJECTION, SOLUTION EPIDURAL; INFILTRATION; INTRACAUDAL; PERINEURAL at 09:09

## 2017-12-27 RX ADMIN — ONDANSETRON 4 MG: 2 INJECTION INTRAMUSCULAR; INTRAVENOUS at 09:27

## 2017-12-27 RX ADMIN — PROPOFOL 200 MG: 10 INJECTION, EMULSION INTRAVENOUS at 09:09

## 2017-12-27 RX ADMIN — MIDAZOLAM HYDROCHLORIDE 2 MG: 1 INJECTION, SOLUTION INTRAMUSCULAR; INTRAVENOUS at 09:00

## 2017-12-27 RX ADMIN — LIDOCAINE HYDROCHLORIDE 0.1 ML: 10 INJECTION, SOLUTION INFILTRATION; PERINEURAL at 07:49

## 2017-12-27 RX ADMIN — EPHEDRINE SULFATE 10 MG: 50 INJECTION, SOLUTION INTRAVENOUS at 09:26

## 2017-12-27 RX ADMIN — FENTANYL CITRATE 50 MCG: 50 INJECTION, SOLUTION INTRAMUSCULAR; INTRAVENOUS at 09:04

## 2017-12-27 RX ADMIN — FENTANYL CITRATE 25 MCG: 50 INJECTION, SOLUTION INTRAMUSCULAR; INTRAVENOUS at 09:34

## 2017-12-27 RX ADMIN — SODIUM CHLORIDE, SODIUM LACTATE, POTASSIUM CHLORIDE, AND CALCIUM CHLORIDE 75 ML/HR: 600; 310; 30; 20 INJECTION, SOLUTION INTRAVENOUS at 07:49

## 2017-12-27 NOTE — OP NOTES
SUCTION CURETTAGE FULL OP NOTE        PATIENT: Layla Gutierres  MRN: [id number]      DATE OF PROCEDURE:  12/27/2017    PREOPERATIVE DIAGNOSIS:  Missed ab [O02.1]    POSTOPERATIVE DIAGNOSIS:  Missed ab [O02.1]    PROCEDURE: Procedure(s):  DILATATION AND CURETTAGE WITH SUCTION . SURGEON:  Keith Calderon DO    ASSISTANT:  none    ANESTHESIA: na   General endotracheal anesthesia. EBL: less than 50 ml cc    FINDINGS: enlarged uterus sounded to 11 cm with POC    DESCRIPTION OF PROCEDURE: The patient was placed on the operating room table in the supine position and placed under general endotracheal anesthesia. Time out was done to confirm the operating procedure, surgeon, patient and site. Once confirmed by the team, procedure was started. PROCEDURE: Patient was placed on the operating table in the supine position and placed under general endotracheal anesthesia. She was repositioned in the dorsal lithotomy position and prepped and draped in the usual fashion for vaginal surgery. Cervix was exposed with a weighted vaginal speculum and grasped with a single-tooth tenaculum. A curved 8 suction curette device was then introduced into the endometrial cavity after it was sounded to approximately 11 cm. Thorough suction curettage followed by sharp curettage with a large curette followed again by suction curettage was performed until the suction returned no further clot or products of conception. The uterus was massaged. Hemostasis appeared normal, Instruments were removed. The patient went to the recovery room in satisfactory condition.   Pt is O+

## 2017-12-27 NOTE — ANESTHESIA PREPROCEDURE EVALUATION
Anesthetic History   No history of anesthetic complications            Review of Systems / Medical History  Patient summary reviewed, nursing notes reviewed and pertinent labs reviewed    Pulmonary  Within defined limits                 Neuro/Psych   Within defined limits           Cardiovascular  Within defined limits                Exercise tolerance: >4 METS     GI/Hepatic/Renal  Within defined limits              Endo/Other  Within defined limits           Other Findings              Physical Exam    Airway  Mallampati: II      Mouth opening: Normal     Cardiovascular  Regular rate and rhythm,  S1 and S2 normal,  no murmur, click, rub, or gallop             Dental  No notable dental hx       Pulmonary  Breath sounds clear to auscultation               Abdominal         Other Findings            Anesthetic Plan    ASA: 1  Anesthesia type: general          Induction: Intravenous  Anesthetic plan and risks discussed with: Patient

## 2017-12-27 NOTE — PROGRESS NOTES
's visit in pre-op due to fetal demise. Conveyed care and concern for Mrs. Ahuja and actively listened. Ms. Ruth Salas stated she was doing \"ok\" and declined early pregnancy resources.      Nurys Glover 66  Board Certified

## 2017-12-27 NOTE — DISCHARGE INSTRUCTIONS
INSTRUCTIONS FOLLOWING D/E    ACTIVITY   Limited activity today; increase as tolerated tomorrow, but no vigorous exercise   Shower only; no tub baths   No douches, tampons or intercourse until your doctor releases you (at least 2 weeks)   May return to work or school as directed by your doctor    DIET   Clear liquids until no nausea or vomiting   Advance to regular diet as tolerated    PAIN   Expect a moderate amount of pain.  Take pain medication as directed by your doctor. If no prescription for pain is sent         home with you, take the appropriate dose of your commonly used pain medication.  Call your doctor if pain is NOT relieved by medication.  DO NOT take aspirin or blood thinners until directed by your doctor. FOLLOW-UP PHONE [address] will be made by nursing staff   If you have any problems, call your doctor as needed. CALL YOUR DOCTOR IF   Excessive bleeding that soaks a pad in an hour   Temperature of 101°F or above   Green or yellow, smelly discharge   Unable to urinate by bedtime   Nausea and vomiting that does not stop by bedtime    AFTER ANESTHESIA   For the first 24 hours: DO NOT Drive, Drink alcoholic beverages, or Make important decisions.  Beware of dizziness following anesthesia and while taking pain medication.    Plan to stay tonight within 1 hours drive of the hospital

## 2017-12-27 NOTE — IP AVS SNAPSHOT
[address] [address] 
[phone] Patient: Toro Ny MRN: [id number] :1975 About your hospitalization You were admitted on:  2017 You last received care in the:  [facility] PACU You were discharged on:  2017 Why you were hospitalized Your primary diagnosis was:  Not on File Your diagnoses also included:  Missed  Things You Need To Do (next 8 weeks) Follow up with Lakesha Le MD  
  
Phone:  [phone] Where:  [facility], [address] Schedule an appointment with Rashida Bliss DO as soon as possible for a visit in 2 week(s) Phone:  [phone] Where:  [address] [place] [address], [facility], [address] [place] [address]  POST OP with Rashida Bliss DO at  9:00 AM  
Where:  [facility] ([place] [address]) Discharge Orders None A check naomi indicates which time of day the medication should be taken. My Medications STOP taking these medications   
 progesterone 100 mg capsule Commonly known as:  PROMETRIUM  
   
  
  
TAKE these medications as instructed Instructions Each Dose to Equal  
 Morning Noon Evening Bedtime AUGMENTIN 875-125 mg per tablet Generic drug:  amoxicillin-clavulanate Your last dose was: Your next dose is: Take  by mouth every twelve (12) hours. CLARITIN 10 mg tablet Generic drug:  loratadine Your last dose was: Your next dose is: Take 10 mg by mouth daily as needed for Allergies. 10 mg  
    
   
   
   
  
 * HYDROcodone-acetaminophen 5-325 mg per tablet Commonly known as:  Bonnie Mejia Your last dose was: Your next dose is: Take 1 Tab by mouth. 1 Tab * HYDROcodone-acetaminophen 7.5-325 mg per tablet Commonly known as:  Rubi Yusuf Your last dose was: Your next dose is: Take 1 Tab by mouth every six (6) hours as needed for Pain. Max Daily Amount: 4 Tabs. 1 Tab  
    
   
   
   
  
 oxyCODONE IR 5 mg immediate release tablet Commonly known as:  Jany Li Your last dose was: Your next dose is: Take 1 Tab by mouth every four (4) hours as needed. Max Daily Amount: 30 mg.  
 5 mg * Notice: This list has 2 medication(s) that are the same as other medications prescribed for you. Read the directions carefully, and ask your doctor or other care provider to review them with you. Where to Get Your Medications Information on where to get these meds will be given to you by the nurse or doctor. ! Ask your nurse or doctor about these medications  
  oxyCODONE IR 5 mg immediate release tablet Discharge Instructions INSTRUCTIONS FOLLOWING D/E 
 
ACTIVITY  Limited activity today; increase as tolerated tomorrow, but no vigorous exercise  Shower only; no tub baths  No douches, tampons or intercourse until your doctor releases you (at least 2 weeks)  May return to work or school as directed by your doctor DIET  Clear liquids until no nausea or vomiting  Advance to regular diet as tolerated PAIN 
 Expect a moderate amount of pain.  Take pain medication as directed by your doctor. If no prescription for pain is sent     
   home with you, take the appropriate dose of your commonly used pain medication.  Call your doctor if pain is NOT relieved by medication.  DO NOT take aspirin or blood thinners until directed by your doctor. FOLLOW-UP PHONE CALLS  Calls will be made by nursing staff  If you have any problems, call your doctor as needed. CALL YOUR DOCTOR IF 
 Excessive bleeding that soaks a pad in an hour  Temperature of 101°F or above  Green or yellow, smelly discharge  Unable to urinate by bedtime  Nausea and vomiting that does not stop by bedtime AFTER ANESTHESIA  For the first 24 hours: DO NOT Drive, Drink alcoholic beverages, or Make important decisions.  Beware of dizziness following anesthesia and while taking pain medication.  Plan to stay tonight within 1 hours drive of the hospital 
 
 
 
 
  
  
  
Introducing [facility]! Dear Jadon White: Thank you for requesting a [employer] account. Our records indicate that you already have an active [employer] account. You can access your account anytime at [email or web address] Did you know that you can access your hospital and ER discharge instructions at any time in [employer]? You can also review all of your test results from your hospital stay or ER visit. Additional Information If you have questions, please visit the Frequently Asked Questions section of the [employer] website at [email or web address] Remember, [employer] is NOT to be used for urgent needs. For medical emergencies, dial 911. Now available from your iPhone and Android! Providers Seen During Your Hospitalization Provider Specialty Primary office phone Jc Marie DO Obstetrics & Gynecology [phone] Your Primary Care Physician (PCP) Primary Care Physician Office Phone Office Fax Alejandra Garber [phone] You are allergic to the following Allergen Reactions Other Plant, Animal, Environmental Unknown (comments) PET DANDER Recent Documentation Height Weight BMI OB Status Smoking Status 1.651 m 64.9 kg 23.8 kg/m2 Pregnant Never Smoker Emergency Contacts Name Discharge Info Relation Home Work Mobile [address]  Spouse [3] [phone] Patient Belongings The following personal items are in your possession at time of discharge: 
                             
 
  
  
 Please provide this summary of care documentation to your next provider. Signatures-by signing, you are acknowledging that this After Visit Summary has been reviewed with you and you have received a copy. Patient Signature:  ____________________________________________________________ Date:  ____________________________________________________________  
  
Merleen Spire Provider Signature:  ____________________________________________________________ Date:  ____________________________________________________________

## 2017-12-27 NOTE — ANESTHESIA POSTPROCEDURE EVALUATION
Post-Anesthesia Evaluation and Assessment    Patient: Lizandro Sarah MRN: [id number]  SSN: [id number]    YOB: 1975  Age: 38 y.o. Sex: female       Cardiovascular Function/Vital Signs  Visit Vitals    BP 92/54    Pulse 61    Temp 37.3 °C (99.2 °F)    Resp 18    Ht 5' 5\" (1.651 m)    Wt 64.9 kg (143 lb)    SpO2 100%    BMI 23.8 kg/m2       Patient is status post general anesthesia for Procedure(s):  DILATATION AND CURETTAGE WITH SUCTION. Nausea/Vomiting: None    Postoperative hydration reviewed and adequate. Pain:  Pain Scale 1: Visual (12/27/17 1007)  Pain Intensity 1: 0 (12/27/17 1007)   Managed    Neurological Status:   Neuro (WDL): Within Defined Limits (12/27/17 1007)  Neuro  Neurologic State: Drowsy (12/27/17 1007)  Orientation Level: Oriented to person;Oriented to place (12/27/17 1007)  LUE Motor Response: Purposeful (12/27/17 1007)  LLE Motor Response: Purposeful (12/27/17 1007)  RUE Motor Response: Purposeful (12/27/17 1007)  RLE Motor Response: Purposeful (12/27/17 1007)   At baseline    Mental Status and Level of Consciousness: Alert and oriented     Pulmonary Status:   O2 Device: Room air (12/27/17 1032)   Adequate oxygenation and airway patent    Complications related to anesthesia: None    Post-anesthesia assessment completed.  No concerns    Signed By: Morris Murray MD     December 27, 2017

## 2018-01-29 ENCOUNTER — HOSPITAL ENCOUNTER (OUTPATIENT)
Dept: MAMMOGRAPHY | Age: 43
Discharge: HOME | End: 2018-01-29
Attending: FAMILY MEDICINE
Payer: COMMERCIAL

## 2018-01-29 DIAGNOSIS — R92.8 ABNORMAL MAMMOGRAM OF LEFT BREAST: ICD-10-CM

## 2018-01-29 PROCEDURE — 76642 ULTRASOUND BREAST LIMITED: CPT

## 2018-03-02 PROBLEM — Z87.19 HISTORY OF DIVERTICULITIS: Status: ACTIVE | Noted: 2018-03-02

## 2018-08-11 ENCOUNTER — HOSPITAL ENCOUNTER (OUTPATIENT)
Dept: MAMMOGRAPHY | Age: 43
Discharge: HOME | End: 2018-08-11
Attending: INTERNAL MEDICINE
Payer: COMMERCIAL

## 2018-08-11 DIAGNOSIS — Z12.31 VISIT FOR SCREENING MAMMOGRAM: ICD-10-CM

## 2018-08-11 PROCEDURE — 77063 BREAST TOMOSYNTHESIS BI: CPT

## 2018-10-17 PROBLEM — R10.84 GENERALIZED ABDOMINAL PAIN: Status: ACTIVE | Noted: 2018-10-17

## 2019-09-07 ENCOUNTER — HOSPITAL ENCOUNTER (OUTPATIENT)
Dept: MAMMOGRAPHY | Age: 44
Discharge: HOME | End: 2019-09-07
Attending: OBSTETRICS & GYNECOLOGY
Payer: COMMERCIAL

## 2019-09-07 DIAGNOSIS — Z12.31 VISIT FOR SCREENING MAMMOGRAM: ICD-10-CM

## 2019-09-07 PROCEDURE — 77063 BREAST TOMOSYNTHESIS BI: CPT

## 2019-09-12 ENCOUNTER — HOSPITAL ENCOUNTER (OUTPATIENT)
Dept: MAMMOGRAPHY | Age: 44
Discharge: HOME | End: 2019-09-12
Attending: OBSTETRICS & GYNECOLOGY
Payer: COMMERCIAL

## 2019-09-12 DIAGNOSIS — R92.8 ABNORMAL SCREENING MAMMOGRAM: ICD-10-CM

## 2019-09-12 PROCEDURE — 77065 DX MAMMO INCL CAD UNI: CPT

## 2019-11-27 PROBLEM — R10.84 GENERALIZED ABDOMINAL PAIN: Status: RESOLVED | Noted: 2018-10-17 | Resolved: 2019-11-27

## 2020-10-10 ENCOUNTER — HOSPITAL ENCOUNTER (OUTPATIENT)
Dept: MAMMOGRAPHY | Age: 45
Discharge: HOME | End: 2020-10-10
Attending: OBSTETRICS & GYNECOLOGY
Payer: COMMERCIAL

## 2020-10-10 DIAGNOSIS — Z12.31 VISIT FOR SCREENING MAMMOGRAM: ICD-10-CM

## 2020-10-10 PROCEDURE — 77063 BREAST TOMOSYNTHESIS BI: CPT

## 2020-10-13 NOTE — PROGRESS NOTES
Neg mammogram.  Dense tissue. Could consider breast mri  - little more sensitive at looking at tissue.

## 2021-04-27 ENCOUNTER — APPOINTMENT (RX ONLY)
Dept: URBAN - METROPOLITAN AREA CLINIC 23 | Facility: CLINIC | Age: 46
Setting detail: DERMATOLOGY
End: 2021-04-27

## 2021-04-27 DIAGNOSIS — Z71.89 OTHER SPECIFIED COUNSELING: ICD-10-CM

## 2021-04-27 DIAGNOSIS — D18.0 HEMANGIOMA: ICD-10-CM

## 2021-04-27 DIAGNOSIS — Z41.9 ENCOUNTER FOR PROCEDURE FOR PURPOSES OTHER THAN REMEDYING HEALTH STATE, UNSPECIFIED: ICD-10-CM

## 2021-04-27 DIAGNOSIS — D22 MELANOCYTIC NEVI: ICD-10-CM

## 2021-04-27 DIAGNOSIS — L82.1 OTHER SEBORRHEIC KERATOSIS: ICD-10-CM

## 2021-04-27 PROBLEM — D18.01 HEMANGIOMA OF SKIN AND SUBCUTANEOUS TISSUE: Status: ACTIVE | Noted: 2021-04-27

## 2021-04-27 PROBLEM — D22.5 MELANOCYTIC NEVI OF TRUNK: Status: ACTIVE | Noted: 2021-04-27

## 2021-04-27 PROBLEM — D22.4 MELANOCYTIC NEVI OF SCALP AND NECK: Status: ACTIVE | Noted: 2021-04-27

## 2021-04-27 PROBLEM — D22.39 MELANOCYTIC NEVI OF OTHER PARTS OF FACE: Status: ACTIVE | Noted: 2021-04-27

## 2021-04-27 PROBLEM — D22.62 MELANOCYTIC NEVI OF LEFT UPPER LIMB, INCLUDING SHOULDER: Status: ACTIVE | Noted: 2021-04-27

## 2021-04-27 PROCEDURE — ? EDUCATIONAL RESOURCES PROVIDED

## 2021-04-27 PROCEDURE — ? OTHER

## 2021-04-27 PROCEDURE — ? MEDICAL CONSULTATION: DYNAMIC RHYTIDES

## 2021-04-27 PROCEDURE — ? COUNSELING

## 2021-04-27 PROCEDURE — 99203 OFFICE O/P NEW LOW 30 MIN: CPT

## 2021-04-27 ASSESSMENT — LOCATION SIMPLE DESCRIPTION DERM
LOCATION SIMPLE: RIGHT BREAST
LOCATION SIMPLE: LEFT EYEBROW
LOCATION SIMPLE: LEFT CHEEK
LOCATION SIMPLE: GLABELLA
LOCATION SIMPLE: RIGHT THIGH
LOCATION SIMPLE: RIGHT LOWER BACK
LOCATION SIMPLE: RIGHT ANTERIOR NECK
LOCATION SIMPLE: RIGHT UPPER BACK
LOCATION SIMPLE: RIGHT FOREHEAD
LOCATION SIMPLE: NECK
LOCATION SIMPLE: RIGHT BACK
LOCATION SIMPLE: LEFT BREAST
LOCATION SIMPLE: LEFT SHOULDER

## 2021-04-27 ASSESSMENT — LOCATION DETAILED DESCRIPTION DERM
LOCATION DETAILED: RIGHT INFERIOR FOREHEAD
LOCATION DETAILED: LEFT SUPERIOR LATERAL NECK
LOCATION DETAILED: RIGHT SUPERIOR UPPER BACK
LOCATION DETAILED: RIGHT SUPERIOR LATERAL UPPER BACK
LOCATION DETAILED: RIGHT ANTERIOR PROXIMAL THIGH
LOCATION DETAILED: RIGHT INFERIOR MEDIAL MIDBACK
LOCATION DETAILED: RIGHT AXILLARY TAIL OF BREAST
LOCATION DETAILED: LEFT INFERIOR CENTRAL MALAR CHEEK
LOCATION DETAILED: GLABELLA
LOCATION DETAILED: RIGHT INFERIOR LATERAL NECK
LOCATION DETAILED: LEFT LATERAL BREAST 3-4:00 REGION
LOCATION DETAILED: LEFT CENTRAL EYEBROW
LOCATION DETAILED: RIGHT MID-UPPER BACK
LOCATION DETAILED: RIGHT INFERIOR LATERAL MIDBACK
LOCATION DETAILED: LEFT ANTERIOR SHOULDER

## 2021-04-27 ASSESSMENT — LOCATION ZONE DERM
LOCATION ZONE: TRUNK
LOCATION ZONE: ARM
LOCATION ZONE: LEG
LOCATION ZONE: NECK
LOCATION ZONE: FACE

## 2021-04-27 NOTE — HPI: FULL BODY SKIN EXAMINATION
What Is The Reason For Today's Visit?: Full Body Skin Examination
What Is The Reason For Today's Visit? (Being Monitored For X): concerning skin lesions on an annual basis
How Severe Are Your Spot(S)?: mild
Additional History: Pt gave verbal consent for treatment/bx today. Witnessed by cl

## 2021-04-27 NOTE — PROCEDURE: OTHER
Render Risk Assessment In Note?: no
Detail Level: Detailed
Note Text (......Xxx Chief Complaint.): This diagnosis correlates with the
Other (Free Text): Pt to contact office if she wants to proceed with Dysport or Botox.

## 2021-05-10 ENCOUNTER — APPOINTMENT (RX ONLY)
Dept: URBAN - METROPOLITAN AREA CLINIC 23 | Facility: CLINIC | Age: 46
Setting detail: DERMATOLOGY
End: 2021-05-10

## 2021-05-10 DIAGNOSIS — Z41.9 ENCOUNTER FOR PROCEDURE FOR PURPOSES OTHER THAN REMEDYING HEALTH STATE, UNSPECIFIED: ICD-10-CM

## 2021-05-10 PROCEDURE — ? MEDICAL CONSULTATION: DYSPORT

## 2021-05-10 PROCEDURE — ? DYSPORT

## 2021-05-10 ASSESSMENT — LOCATION SIMPLE DESCRIPTION DERM: LOCATION SIMPLE: GLABELLA

## 2021-05-10 ASSESSMENT — LOCATION ZONE DERM: LOCATION ZONE: FACE

## 2021-05-10 ASSESSMENT — LOCATION DETAILED DESCRIPTION DERM: LOCATION DETAILED: GLABELLA

## 2021-05-10 NOTE — PROCEDURE: DYSPORT
Show Levator Superior Units: Yes
Nasal Root Units: 0
Reconstitution Date (Optional): 4/28/2021
Lot #: [id number]
Price (Use Numbers Only, No Special Characters Or $): 100
Show Mentalis Units: No
Expiration Date (Month Year): 10/31/2021
Detail Level: Detailed
Dilution (U/0.1 Cc): 2.5
Glabellar Complex Units: 25
Consent: Written consent obtained. Risks include but not limited to lid/brow ptosis, bruising, swelling, diplopia, temporary effect, incomplete chemical denervation.
Post-Care Instructions: Patient instructed to not lie down for 4 hours and limit physical activity for 24 hours. Patient instructed not to travel by airplane for 48 hours.

## 2021-07-20 ENCOUNTER — APPOINTMENT (RX ONLY)
Dept: URBAN - METROPOLITAN AREA CLINIC 23 | Facility: CLINIC | Age: 46
Setting detail: DERMATOLOGY
End: 2021-07-20

## 2021-07-20 DIAGNOSIS — Z41.9 ENCOUNTER FOR PROCEDURE FOR PURPOSES OTHER THAN REMEDYING HEALTH STATE, UNSPECIFIED: ICD-10-CM

## 2021-07-20 PROCEDURE — ? DYSPORT

## 2021-07-20 PROCEDURE — ? MEDICAL CONSULTATION: DYSPORT

## 2021-07-20 ASSESSMENT — LOCATION SIMPLE DESCRIPTION DERM: LOCATION SIMPLE: GLABELLA

## 2021-07-20 ASSESSMENT — LOCATION DETAILED DESCRIPTION DERM: LOCATION DETAILED: GLABELLA

## 2021-07-20 ASSESSMENT — LOCATION ZONE DERM: LOCATION ZONE: FACE

## 2021-07-20 NOTE — PROCEDURE: DYSPORT
Show Levator Superior Units: Yes
Mentalis Units: 0
Detail Level: Detailed
Lot #: [id number]
Reconstitution Date (Optional): 7/20/21
Show Right And Left Brow Units: No
Dilution (U/0.1 Cc): 2.5
Consent: Written consent obtained. Risks include but not limited to lid/brow ptosis, bruising, swelling, diplopia, temporary effect, incomplete chemical denervation.
Post-Care Instructions: Patient instructed to not lie down for 4 hours and limit physical activity for 24 hours. Patient instructed not to travel by airplane for 48 hours.
Price (Use Numbers Only, No Special Characters Or $): 100
Expiration Date (Month Year): 03/31/2022
Glabellar Complex Units: 25

## 2021-08-02 ENCOUNTER — APPOINTMENT (RX ONLY)
Dept: URBAN - METROPOLITAN AREA CLINIC 23 | Facility: CLINIC | Age: 46
Setting detail: DERMATOLOGY
End: 2021-08-02

## 2021-09-22 ENCOUNTER — TRANSCRIBE ORDER (OUTPATIENT)
Dept: SCHEDULING | Age: 46
End: 2021-09-22

## 2021-09-22 DIAGNOSIS — Z12.31 VISIT FOR SCREENING MAMMOGRAM: Primary | ICD-10-CM

## 2021-10-12 ENCOUNTER — APPOINTMENT (RX ONLY)
Dept: URBAN - METROPOLITAN AREA CLINIC 23 | Facility: CLINIC | Age: 46
Setting detail: DERMATOLOGY
End: 2021-10-12

## 2021-10-12 DIAGNOSIS — Z41.9 ENCOUNTER FOR PROCEDURE FOR PURPOSES OTHER THAN REMEDYING HEALTH STATE, UNSPECIFIED: ICD-10-CM

## 2021-10-12 PROCEDURE — ? DYSPORT

## 2021-10-12 PROCEDURE — ? MEDICAL CONSULTATION: DYSPORT

## 2021-10-12 ASSESSMENT — LOCATION SIMPLE DESCRIPTION DERM
LOCATION SIMPLE: INFERIOR FOREHEAD
LOCATION SIMPLE: RIGHT EYEBROW
LOCATION SIMPLE: GLABELLA
LOCATION SIMPLE: LEFT FOREHEAD
LOCATION SIMPLE: GLABELLA

## 2021-10-12 ASSESSMENT — LOCATION DETAILED DESCRIPTION DERM
LOCATION DETAILED: RIGHT MEDIAL EYEBROW
LOCATION DETAILED: GLABELLA
LOCATION DETAILED: GLABELLA
LOCATION DETAILED: INFERIOR MID FOREHEAD
LOCATION DETAILED: LEFT INFERIOR MEDIAL FOREHEAD

## 2021-10-12 ASSESSMENT — LOCATION ZONE DERM
LOCATION ZONE: FACE
LOCATION ZONE: FACE

## 2021-10-12 NOTE — PROCEDURE: DYSPORT
Forehead Units: 0
Show Glabellar Units: Yes
Dilution (U/0.1 Cc): 2.5
Show Right And Left Periorbital Units: No
Glabellar Complex Units: 25
Consent: Written consent obtained. Risks include but not limited to lid/brow ptosis, bruising, swelling, diplopia, temporary effect, incomplete chemical denervation.
Post-Care Instructions: Patient instructed to not lie down for 4 hours and limit physical activity for 24 hours. Patient instructed not to travel by airplane for 48 hours.
Price (Use Numbers Only, No Special Characters Or $): 100
Expiration Date (Month Year): 03/31/2022
Reconstitution Date (Optional): 7/20/21
Detail Level: Detailed
Lot #: [id number]

## 2021-10-23 ENCOUNTER — HOSPITAL ENCOUNTER (OUTPATIENT)
Dept: MAMMOGRAPHY | Age: 46
Discharge: HOME | End: 2021-10-23
Attending: OBSTETRICS & GYNECOLOGY
Payer: COMMERCIAL

## 2021-10-23 DIAGNOSIS — Z12.31 VISIT FOR SCREENING MAMMOGRAM: ICD-10-CM

## 2021-10-23 PROCEDURE — 77063 BREAST TOMOSYNTHESIS BI: CPT

## 2021-10-25 NOTE — PROGRESS NOTES
Neg mammogram.  Dense tissue is noted. Could consider breast mri in the future which is very good at looking at dense tissue.

## 2022-01-18 ENCOUNTER — APPOINTMENT (RX ONLY)
Dept: URBAN - METROPOLITAN AREA CLINIC 23 | Facility: CLINIC | Age: 47
Setting detail: DERMATOLOGY
End: 2022-01-18

## 2022-01-18 DIAGNOSIS — Z41.9 ENCOUNTER FOR PROCEDURE FOR PURPOSES OTHER THAN REMEDYING HEALTH STATE, UNSPECIFIED: ICD-10-CM

## 2022-01-18 PROCEDURE — ? MEDICAL CONSULTATION: DYSPORT

## 2022-01-18 PROCEDURE — ? DYSPORT

## 2022-01-18 ASSESSMENT — LOCATION ZONE DERM
LOCATION ZONE: FACE
LOCATION ZONE: FACE

## 2022-01-18 ASSESSMENT — LOCATION SIMPLE DESCRIPTION DERM
LOCATION SIMPLE: RIGHT EYEBROW
LOCATION SIMPLE: INFERIOR FOREHEAD
LOCATION SIMPLE: GLABELLA
LOCATION SIMPLE: GLABELLA
LOCATION SIMPLE: LEFT FOREHEAD

## 2022-01-18 ASSESSMENT — LOCATION DETAILED DESCRIPTION DERM
LOCATION DETAILED: GLABELLA
LOCATION DETAILED: LEFT INFERIOR MEDIAL FOREHEAD
LOCATION DETAILED: RIGHT MEDIAL EYEBROW
LOCATION DETAILED: INFERIOR MID FOREHEAD
LOCATION DETAILED: GLABELLA

## 2022-01-18 NOTE — PROCEDURE: DYSPORT
L Brow Units: 0
Show Ucl Units: No
Show Additional Area 3: Yes
Post-Care Instructions: Patient instructed to not lie down for 4 hours and limit physical activity for 24 hours. Patient instructed not to travel by airplane for 48 hours.
Dilution (U/0.1 Cc): 2.5
Consent: Written consent obtained. Risks include but not limited to lid/brow ptosis, bruising, swelling, diplopia, temporary effect, incomplete chemical denervation.
Expiration Date (Month Year): 03/31/2022
Reconstitution Date (Optional): 7/20/21
Lot #: [id number]
Glabellar Complex Units: 25
Price (Use Numbers Only, No Special Characters Or $): 100
Detail Level: Detailed

## 2022-01-24 ENCOUNTER — APPOINTMENT (RX ONLY)
Dept: URBAN - METROPOLITAN AREA CLINIC 23 | Facility: CLINIC | Age: 47
Setting detail: DERMATOLOGY
End: 2022-01-24

## 2022-01-24 DIAGNOSIS — Z41.9 ENCOUNTER FOR PROCEDURE FOR PURPOSES OTHER THAN REMEDYING HEALTH STATE, UNSPECIFIED: ICD-10-CM

## 2022-01-24 PROCEDURE — ? MEDICAL CONSULTATION: DYSPORT

## 2022-01-24 PROCEDURE — ? DYSPORT

## 2022-01-24 ASSESSMENT — LOCATION SIMPLE DESCRIPTION DERM
LOCATION SIMPLE: RIGHT EYEBROW
LOCATION SIMPLE: INFERIOR FOREHEAD
LOCATION SIMPLE: GLABELLA
LOCATION SIMPLE: LEFT FOREHEAD
LOCATION SIMPLE: GLABELLA

## 2022-01-24 ASSESSMENT — LOCATION ZONE DERM
LOCATION ZONE: FACE
LOCATION ZONE: FACE

## 2022-01-24 ASSESSMENT — LOCATION DETAILED DESCRIPTION DERM
LOCATION DETAILED: GLABELLA
LOCATION DETAILED: GLABELLA
LOCATION DETAILED: LEFT INFERIOR MEDIAL FOREHEAD
LOCATION DETAILED: INFERIOR MID FOREHEAD
LOCATION DETAILED: RIGHT MEDIAL EYEBROW

## 2022-01-24 NOTE — PROCEDURE: DYSPORT
Show Right And Left Pupillary Line Units: No
Expiration Date (Month Year): 05/31/2022
Price (Use Numbers Only, No Special Characters Or $): 200
Show Additional Area 6: Yes
Reconstitution Date (Optional): 1/20/22
R Brow Units: 0
Lot #: [id number]
Forehead Units: 30
Detail Level: Detailed
Dilution (U/0.1 Cc): 2.5
Consent: Written consent obtained. Risks include but not limited to lid/brow ptosis, bruising, swelling, diplopia, temporary effect, incomplete chemical denervation.
Glabellar Complex Units: 20
Post-Care Instructions: Patient instructed to not lie down for 4 hours and limit physical activity for 24 hours. Patient instructed not to travel by airplane for 48 hours.

## 2022-03-18 PROBLEM — G89.29 CHRONIC BACK PAIN: Status: ACTIVE | Noted: 2017-03-17

## 2022-03-18 PROBLEM — M54.9 CHRONIC BACK PAIN: Status: ACTIVE | Noted: 2017-03-17

## 2022-03-18 PROBLEM — Z87.19 HISTORY OF DIVERTICULITIS: Status: ACTIVE | Noted: 2018-03-02

## 2022-03-19 PROBLEM — O02.1 MISSED ABORTION: Status: ACTIVE | Noted: 2017-12-26

## 2022-05-06 ENCOUNTER — APPOINTMENT (RX ONLY)
Dept: URBAN - METROPOLITAN AREA CLINIC 24 | Facility: CLINIC | Age: 47
Setting detail: DERMATOLOGY
End: 2022-05-06

## 2022-05-06 DIAGNOSIS — D22 MELANOCYTIC NEVI: ICD-10-CM

## 2022-05-06 DIAGNOSIS — L57.8 OTHER SKIN CHANGES DUE TO CHRONIC EXPOSURE TO NONIONIZING RADIATION: ICD-10-CM

## 2022-05-06 DIAGNOSIS — L82.1 OTHER SEBORRHEIC KERATOSIS: ICD-10-CM

## 2022-05-06 DIAGNOSIS — D18.0 HEMANGIOMA: ICD-10-CM

## 2022-05-06 DIAGNOSIS — Z41.9 ENCOUNTER FOR PROCEDURE FOR PURPOSES OTHER THAN REMEDYING HEALTH STATE, UNSPECIFIED: ICD-10-CM

## 2022-05-06 PROBLEM — D22.4 MELANOCYTIC NEVI OF SCALP AND NECK: Status: ACTIVE | Noted: 2022-05-06

## 2022-05-06 PROBLEM — D18.01 HEMANGIOMA OF SKIN AND SUBCUTANEOUS TISSUE: Status: ACTIVE | Noted: 2022-05-06

## 2022-05-06 PROBLEM — D22.62 MELANOCYTIC NEVI OF LEFT UPPER LIMB, INCLUDING SHOULDER: Status: ACTIVE | Noted: 2022-05-06

## 2022-05-06 PROBLEM — D22.5 MELANOCYTIC NEVI OF TRUNK: Status: ACTIVE | Noted: 2022-05-06

## 2022-05-06 PROBLEM — D22.39 MELANOCYTIC NEVI OF OTHER PARTS OF FACE: Status: ACTIVE | Noted: 2022-05-06

## 2022-05-06 PROCEDURE — ? MEDICAL CONSULTATION: DYSPORT

## 2022-05-06 PROCEDURE — ? DYSPORT

## 2022-05-06 PROCEDURE — 99213 OFFICE O/P EST LOW 20 MIN: CPT

## 2022-05-06 PROCEDURE — ? COUNSELING

## 2022-05-06 ASSESSMENT — LOCATION SIMPLE DESCRIPTION DERM
LOCATION SIMPLE: ABDOMEN
LOCATION SIMPLE: RIGHT BACK
LOCATION SIMPLE: GLABELLA
LOCATION SIMPLE: LEFT FOREHEAD
LOCATION SIMPLE: LEFT BREAST
LOCATION SIMPLE: NECK
LOCATION SIMPLE: RIGHT ANTERIOR NECK
LOCATION SIMPLE: LEFT EYEBROW
LOCATION SIMPLE: RIGHT BREAST
LOCATION SIMPLE: RIGHT THIGH
LOCATION SIMPLE: RIGHT FOREHEAD
LOCATION SIMPLE: LEFT SHOULDER
LOCATION SIMPLE: RIGHT SHOULDER
LOCATION SIMPLE: RIGHT EYEBROW
LOCATION SIMPLE: LEFT CHEEK
LOCATION SIMPLE: INFERIOR FOREHEAD
LOCATION SIMPLE: GLABELLA
LOCATION SIMPLE: CHEST
LOCATION SIMPLE: RIGHT UPPER BACK
LOCATION SIMPLE: RIGHT LOWER BACK

## 2022-05-06 ASSESSMENT — LOCATION DETAILED DESCRIPTION DERM
LOCATION DETAILED: RIGHT SUPERIOR MEDIAL UPPER BACK
LOCATION DETAILED: RIGHT POSTERIOR SHOULDER
LOCATION DETAILED: RIGHT INFERIOR LATERAL NECK
LOCATION DETAILED: LEFT INFERIOR CENTRAL MALAR CHEEK
LOCATION DETAILED: PERIUMBILICAL SKIN
LOCATION DETAILED: LEFT LATERAL BREAST 3-4:00 REGION
LOCATION DETAILED: UPPER STERNUM
LOCATION DETAILED: LEFT ANTERIOR SHOULDER
LOCATION DETAILED: RIGHT ANTERIOR PROXIMAL THIGH
LOCATION DETAILED: RIGHT SUPERIOR LATERAL UPPER BACK
LOCATION DETAILED: RIGHT INFERIOR FOREHEAD
LOCATION DETAILED: LEFT POSTERIOR SHOULDER
LOCATION DETAILED: LEFT INFERIOR MEDIAL FOREHEAD
LOCATION DETAILED: LEFT CENTRAL EYEBROW
LOCATION DETAILED: GLABELLA
LOCATION DETAILED: LEFT SUPERIOR LATERAL NECK
LOCATION DETAILED: RIGHT AXILLARY TAIL OF BREAST
LOCATION DETAILED: RIGHT INFERIOR MEDIAL MIDBACK
LOCATION DETAILED: INFERIOR MID FOREHEAD
LOCATION DETAILED: RIGHT INFERIOR LATERAL MIDBACK
LOCATION DETAILED: GLABELLA
LOCATION DETAILED: RIGHT MID-UPPER BACK
LOCATION DETAILED: RIGHT MEDIAL EYEBROW

## 2022-05-06 ASSESSMENT — LOCATION ZONE DERM
LOCATION ZONE: FACE
LOCATION ZONE: LEG
LOCATION ZONE: NECK
LOCATION ZONE: ARM
LOCATION ZONE: TRUNK
LOCATION ZONE: FACE
LOCATION ZONE: FACE

## 2022-05-06 NOTE — HPI: FULL BODY SKIN EXAMINATION
What Type Of Note Output Would You Prefer (Optional)?: Standard Output
What Is The Reason For Today's Visit?: Full Body Skin Examination
What Is The Reason For Today's Visit? (Being Monitored For X): concerning skin lesions on an annual basis
How Severe Are Your Spot(S)?: mild
Additional History: Pt gives verbal consent for biopsy. BRADLEY Tristan

## 2022-05-06 NOTE — PROCEDURE: DYSPORT
Lateral Platysmal Bands Units: 0
Show Nasal Units: Yes
Show Ucl Units: No
Reconstitution Date (Optional): 04/27/22
Glabellar Complex Units: 25
Consent: Written consent obtained. Risks include but not limited to lid/brow ptosis, bruising, swelling, diplopia, temporary effect, incomplete chemical denervation.
Price (Use Numbers Only, No Special Characters Or $): 100
Expiration Date (Month Year): 09/30/2022
Lot #: [id number]
Detail Level: Detailed
Post-Care Instructions: Patient instructed to not lie down for 4 hours and limit physical activity for 24 hours. Patient instructed not to travel by airplane for 48 hours.
Dilution (U/0.1 Cc): 2.5

## 2022-06-10 RX ORDER — NORETHINDRONE 0.35 MG
KIT ORAL
Qty: 28 TABLET | Refills: 3 | Status: SHIPPED | OUTPATIENT
Start: 2022-06-10

## 2022-08-04 ENCOUNTER — OFFICE VISIT (OUTPATIENT)
Dept: OBGYN CLINIC | Age: 47
End: 2022-08-04
Payer: COMMERCIAL

## 2022-08-04 VITALS
HEIGHT: 66 IN | WEIGHT: 137.2 LBS | SYSTOLIC BLOOD PRESSURE: 102 MMHG | BODY MASS INDEX: 22.05 KG/M2 | DIASTOLIC BLOOD PRESSURE: 64 MMHG

## 2022-08-04 DIAGNOSIS — Z13.89 SCREENING FOR GENITOURINARY CONDITION: ICD-10-CM

## 2022-08-04 DIAGNOSIS — R92.2 DENSE BREAST TISSUE: ICD-10-CM

## 2022-08-04 DIAGNOSIS — Z86.69 HX OF MIGRAINE HEADACHES: ICD-10-CM

## 2022-08-04 DIAGNOSIS — Z12.31 VISIT FOR SCREENING MAMMOGRAM: ICD-10-CM

## 2022-08-04 DIAGNOSIS — Z01.419 WELL WOMAN EXAM: Primary | ICD-10-CM

## 2022-08-04 LAB
BILIRUBIN, URINE, POC: NEGATIVE
BLOOD URINE, POC: NORMAL
GLUCOSE URINE, POC: NEGATIVE
KETONES, URINE, POC: NEGATIVE
LEUKOCYTE ESTERASE, URINE, POC: NORMAL
NITRITE, URINE, POC: NEGATIVE
PH, URINE, POC: 6 (ref 4.6–8)
PROTEIN,URINE, POC: NEGATIVE
SPECIFIC GRAVITY, URINE, POC: 1.01 (ref 1–1.03)
URINALYSIS CLARITY, POC: CLEAR
URINALYSIS COLOR, POC: YELLOW
UROBILINOGEN, POC: NORMAL

## 2022-08-04 PROCEDURE — 81002 URINALYSIS NONAUTO W/O SCOPE: CPT | Performed by: OBSTETRICS & GYNECOLOGY

## 2022-08-04 PROCEDURE — 99396 PREV VISIT EST AGE 40-64: CPT | Performed by: OBSTETRICS & GYNECOLOGY

## 2022-08-04 RX ORDER — DROSPIRENONE 4 MG/1
1 TABLET, FILM COATED ORAL DAILY
Qty: 28 TABLET | Refills: 11 | Status: SHIPPED | OUTPATIENT
Start: 2022-08-04

## 2022-08-04 RX ORDER — ACETAMINOPHEN AND CODEINE PHOSPHATE 120; 12 MG/5ML; MG/5ML
SOLUTION ORAL
Qty: 90 TABLET | Refills: 3 | Status: CANCELLED | OUTPATIENT
Start: 2022-08-04

## 2022-08-04 NOTE — PROGRESS NOTES
HPI:  Ms. Gaetano Goltz is a 48 y.o. female [id number] who is here today for a well woman exam. Pt would like a referral to neurology for migraines. States migraines have been worsening x past few years and she has never seen a neurologist.  Was seen last year for ovarian cyst / Brigette Schroederyer on prog only ocps. Having 10 days of migraine each month. She is seeing chiropractor. Her cycle is unpredictable on prog only ocps but this is expected. Could try Jolorenaan Angry / Luis Mutton / Adelbert Range iud. Date Performed Result   PAP 21 Negative HR HPV Negative   Mammogram 10/23/21 BD3-offer MRI   Colonoscopy 20 Diverticulosis, Hemorrhoids   Dexa NA        OB History          4    Para   2    Term   2            AB   2    Living   2         SAB   2    IAB        Ectopic        Molar        Multiple        Live Births   2            6 and 13 yo. GYN History     Patient's last menstrual period was 2022 (approximate). Cycles are monthly but irregular. States menses in May lasted 4 weeks.     Past Medical History:   Diagnosis Date    Abnormal Papanicolaou smear of cervix     Cystocele     Rectocele    Diverticulitis     Migraines     Missed  2017    D&C    Ovarian cyst     SAB (spontaneous )     Vaginal delivery     x1    Vitamin D deficiency      Past Surgical History:   Procedure Laterality Date     SECTION  8/20/2013    X1    CHOLECYSTECTOMY  2013    COLPOSCOPY      DILATION AND CURETTAGE OF UTERUS      OTHER SURGICAL HISTORY      IVF    PELVIC LAPAROSCOPY         No Known Allergies    Current Outpatient Medications   Medication Sig Dispense Refill    Drospirenone (SLYND) 4 MG TABS Take 1 tablet by mouth daily 28 tablet 11    SHAROBEL 0.35 MG tablet TAKE ONE TABLET BY MOUTH ONE TIME DAILY 28 tablet 3    butalbital-acetaminophen-caffeine (FIORICET, ESGIC) -40 MG per tablet Take 1 tablet by mouth every 6 hours as needed      ibuprofen (ADVIL;MOTRIN) 200 MG tablet Take by mouth loratadine (CLARITIN) 10 MG tablet Take 10 mg by mouth daily as needed      SUMAtriptan (IMITREX) 50 MG tablet Take 1 tablet at onset of migraine, can repeat 2 hrs later if no sig improvement. Max dose 2 tab/day       No current facility-administered medications for this visit. Social History     Socioeconomic History    Marital status:  - seeing [facility]who [facility]      Spouse name: Not on file    Number of children: Not on file    Years of education: Not on file    Highest education level: Not on file   Occupational History    Sale   -[employer] for [employer]. . Tobacco Use    Smoking status: Never    Smokeless tobacco: Never   Vaping Use    Vaping Use: Never used   Substance and Sexual Activity    Alcohol use: Yes    Drug use: No    Sexual activity: Yes     Partners: Male     Birth control/protection: Pill   Other Topics Concern    4-6 weeks. Social History Narrative    Not on file     Social Determinants of Health     Financial Resource Strain: Not on file   Food Insecurity: Not on file   Transportation Needs: Not on file   Physical Activity: Not on file   Stress: Not on file   Social Connections: Not on file   Intimate Partner Violence: Not on file   Housing Stability: Not on file     Family History   Problem Relation Age of Onset    Breast Cancer Neg Hx     No Known Problems Paternal Grandfather     No Known Problems Paternal Grandmother     No Known Problems Maternal Grandfather     Stomach Cancer Maternal Uncle     Thyroid Disease Mother     Migraines Mother     Thyroid Cancer Mother     Hypertension Father     No Known Problems Maternal Grandmother        Review of Systems       /64   Ht 5' 5.5\" (1.664 m)   Wt 137 lb 3.2 oz (62.2 kg)   LMP 07/06/2022 (Approximate)   BMI 22.48 kg/m²        Physical Exam  Constitutional:       General: She is not in acute distress. Appearance: She is well-developed. HENT:      Head: Normocephalic and atraumatic.    Neck: Thyroid: No thyroid mass or thyromegaly. Cardiovascular:      Rate and Rhythm: Normal rate and regular rhythm. Pulmonary:      Effort: Pulmonary effort is normal.      Breath sounds: Normal breath sounds. Chest:   Breasts:     Right: Normal. No mass or skin change. Left: Normal. No mass or skin change. Abdominal:      General: There is no distension. Palpations: Abdomen is soft. Tenderness: There is no abdominal tenderness. Genitourinary:     General: Normal vulva. Labia:         Right: No rash or lesion. Left: No rash or lesion. Vagina: Normal.      Cervix: Normal.      Uterus: Normal. Not enlarged. Adnexa: Right adnexa normal and left adnexa normal.   Musculoskeletal:      Cervical back: Normal range of motion and neck supple. Neurological:      General: No focal deficit present. Mental Status: She is alert. Psychiatric:         Attention and Perception: Attention normal.         Mood and Affect: Mood and affect normal.       Assessment/Plan  Chelsey Landon was seen today for annual exam.    Diagnoses and all orders for this visit:    Well woman exam  -     AMB POC URINALYSIS DIP STICK MANUAL W/O MICRO    Visit for screening mammogram  -     [facility] THANH DIGITAL SCREEN BILATERAL; Future    Dense breast tissue  -     [facility]ANH DIGITAL SCREEN BILATERAL; Future    Screening for genitourinary condition  -     AMB POC URINALYSIS DIP STICK MANUAL W/O MICRO    Hx of migraine headaches  -     [address] [facility] [address] Stiven Hebert DO, Neurology, [facility]    Other orders  -     Drospirenone (SLYND) 4 MG TABS; Take 1 tablet by mouth daily   Finish micronor and change to slynd.   Neuro referral.      Return in about 1 year (around 8/4/2023) for annual exam.     Valentino Aguero D.O

## 2022-09-21 RX ORDER — ACETAMINOPHEN AND CODEINE PHOSPHATE 120; 12 MG/5ML; MG/5ML
SOLUTION ORAL
Qty: 84 TABLET | OUTPATIENT
Start: 2022-09-21

## 2022-09-22 ENCOUNTER — APPOINTMENT (RX ONLY)
Dept: URBAN - METROPOLITAN AREA CLINIC 23 | Facility: CLINIC | Age: 47
Setting detail: DERMATOLOGY
End: 2022-09-22

## 2022-09-22 DIAGNOSIS — Z41.9 ENCOUNTER FOR PROCEDURE FOR PURPOSES OTHER THAN REMEDYING HEALTH STATE, UNSPECIFIED: ICD-10-CM

## 2022-09-22 PROCEDURE — ? DYSPORT

## 2022-09-22 PROCEDURE — ? MEDICAL CONSULTATION: DYSPORT

## 2022-09-22 ASSESSMENT — LOCATION DETAILED DESCRIPTION DERM
LOCATION DETAILED: RIGHT MEDIAL EYEBROW
LOCATION DETAILED: GLABELLA
LOCATION DETAILED: GLABELLA
LOCATION DETAILED: LEFT INFERIOR MEDIAL FOREHEAD
LOCATION DETAILED: INFERIOR MID FOREHEAD

## 2022-09-22 ASSESSMENT — LOCATION SIMPLE DESCRIPTION DERM
LOCATION SIMPLE: LEFT FOREHEAD
LOCATION SIMPLE: RIGHT EYEBROW
LOCATION SIMPLE: GLABELLA
LOCATION SIMPLE: INFERIOR FOREHEAD
LOCATION SIMPLE: GLABELLA

## 2022-09-22 ASSESSMENT — LOCATION ZONE DERM
LOCATION ZONE: FACE
LOCATION ZONE: FACE

## 2022-09-22 NOTE — PROCEDURE: DYSPORT
Levator Labii Superioris Units: 0
Detail Level: Detailed
Show Depressor Anguli Units: Yes
Show Right And Left Periorbital Units: No
Price (Use Numbers Only, No Special Characters Or $): 100
Glabellar Complex Units: 25
Post-Care Instructions: Patient instructed to not lie down for 4 hours and limit physical activity for 24 hours. Patient instructed not to travel by airplane for 48 hours.
Dilution (U/0.1 Cc): 2.5
Expiration Date (Month Year): 09/30/2022
Lot #: [id number]
Reconstitution Date (Optional): 04/27/22
Consent: Written consent obtained. Risks include but not limited to lid/brow ptosis, bruising, swelling, diplopia, temporary effect, incomplete chemical denervation.

## 2022-11-29 ENCOUNTER — TELEMEDICINE (OUTPATIENT)
Dept: NEUROLOGY | Age: 47
End: 2022-11-29
Payer: COMMERCIAL

## 2022-11-29 DIAGNOSIS — G43.001 MIGRAINE WITHOUT AURA AND WITH STATUS MIGRAINOSUS, NOT INTRACTABLE: Primary | ICD-10-CM

## 2022-11-29 PROCEDURE — 99243 OFF/OP CNSLTJ NEW/EST LOW 30: CPT | Performed by: PSYCHIATRY & NEUROLOGY

## 2022-11-29 RX ORDER — TOPIRAMATE 25 MG/1
25 TABLET ORAL NIGHTLY
Qty: 60 TABLET | Refills: 3 | Status: SHIPPED | OUTPATIENT
Start: 2022-11-29

## 2022-11-29 RX ORDER — FROVATRIPTAN SUCCINATE 2.5 MG/1
TABLET, FILM COATED ORAL
Qty: 12 TABLET | Refills: 5 | Status: SHIPPED | OUTPATIENT
Start: 2022-11-29

## 2022-11-29 NOTE — PROGRESS NOTES
Bart Schaefer (:  1975) is a New patient, here for evaluation of the following:    Assessment & Plan   Below is the assessment and plan developed based on review of pertinent history, physical exam, labs, studies, and medications. 1. Migraine without aura and with status migrainosus, not intractable  -     topiramate (TOPAMAX) 25 MG tablet; Take 1 tablet by mouth nightly May up to 1 bid in 3-4 weeks prn., Disp-60 tablet, R-3Normal  -     frovatriptan succinate (FROVA) 2.5 MG TABS; [email or web address] migraine, may repeat in 2-3 hours prn, max 2 tahs/24 hrs., Disp-12 tablet, R-5Normal  Mostly catamenial migraines 6 to 10 days/month, start preventives Topamax low dosage, side effects were addressed, drink adequate water to prevent kidney stone. Try B2 and magnesium as well. Imitrex able to abort migraine attack but caused more intense rebound headaches, will switch to Frova for better coverage given its longer half-life. Next option for preventation would be CGRP blocker monthly injection. Return if symptoms worsen or fail to improve and mychart update. Subjective   Migraine since teenage, headaches, seeing spots, nausea during  [address] FH her mother + migraine. Migraine has increased lately, pain in frontal behind eyes and posterior and neck bilaterally. Pain 3-6/10, pressure pain asso with photophobia, sensitive to noise, often cluster for couple of days, duration 2-3 days. Takes ibuprofen and fioricet to relieve partially, took a nap, but returned. Imitrex could relieve the rest of day, but rebound the following day then lasting whole week. Frequency around [address] 6-10 days per month. Other triggers exposure to heat. Rarely squeezing sensation in face, lying down worse. No asthma, kidney stone, glaucoma, HTN, palpitation, depression or anxiety, sleep disturbance. No head or neck injury. Hx diverticulosis, but normal bowel movement. Review of Systems   Musculoskeletal:  Positive for neck pain. Neurological:  Positive for headaches. Objective   Patient-Reported Vitals  No data recorded     Physical Exam  [INSTRUCTIONS:  \"[x]\" Indicates a positive item  \"[]\" Indicates a negative item  -- DELETE ALL ITEMS NOT EXAMINED]    Constitutional: [x] Appears well-developed and well-nourished [x] No apparent distress      [] Abnormal -     Mental status: [x] Alert and awake  [x] Oriented to person/place/time [x] Able to follow commands. Provided clear history, memory capacity was normal, no dysphasia. [] Abnormal -     Eyes:   EOM    [x]  Normal    [] Abnormal -   Sclera  [x]  Normal    [] Abnormal -          Discharge [x]  None visible   [] Abnormal -     HENT: [x] Normocephalic, atraumatic  [] Abnormal -   [x]     External Ears [x] Normal hearing [] Abnormal -    Neck: [x]  [] Abnormal -     Pulmonary/Chest: [x] Respiratory effort normal   [x] No visualized signs of difficulty breathing or respiratory distress        [] Abnormal -      Musculoskeletal:   [x] Normal gait with no signs of ataxia         [x] Normal range of motion of neck        [] Abnormal -     Neurological:        [x] No Facial Asymmetry (Cranial nerve 7 motor function) (limited exam due to video visit)          [x] No gaze palsy        [] Abnormal -          Skin:        [x]          [] Abnormal -            Psychiatric:       [x] Normal Affect [] Abnormal -        [x] No Hallucinations    Other pertinent observable physical exam findings:-         On this date 11/29/2022 I have spent 40 minutes reviewing previous notes, test results and face to face (virtual) with the patient discussing the diagnosis and importance of compliance with the treatment plan as well as documenting on the day of the visit. Sheyla Calles, was evaluated through a synchronous (real-time) audio-video encounter. The patient (or guardian if applicable) is aware that this is a billable service, which includes applicable co-pays.  This Virtual Visit was conducted with patient's (and/or legal guardian's) consent. The visit was conducted pursuant to the emergency declaration under the [address] [place] [address] and the [employer] Act. Patient identification was verified, and a caregiver was present when appropriate. The patient was located at Home: [address] [address].    Provider was located at [facility] [address] [address] Shahriar Gardner MD

## 2022-12-17 ENCOUNTER — HOSPITAL ENCOUNTER (OUTPATIENT)
Dept: MAMMOGRAPHY | Age: 47
End: 2022-12-17
Payer: COMMERCIAL

## 2022-12-17 DIAGNOSIS — Z12.31 VISIT FOR SCREENING MAMMOGRAM: ICD-10-CM

## 2022-12-17 DIAGNOSIS — R92.2 DENSE BREAST TISSUE: ICD-10-CM

## 2022-12-17 PROCEDURE — 77063 BREAST TOMOSYNTHESIS BI: CPT

## 2022-12-26 NOTE — PROGRESS NOTES
Benitez Lawler  : 1975 [address] at [address], [facility], [address]  [phone]   [phone]         OUTPATIENT PHYSICAL THERAPY:Initial Assessment and Daily Note 2017    ICD-10: Treatment Diagnosis: (M 25.551) R hip pain, (M 54.5) LBP, and (M62.81) muscle weakness  Precautions/Allergies: Other plant, animal, environmental none  Fall Risk Score: 0 (? 5 = High Risk)  MD Orders: Eval and rectus diastesis MEDICAL/REFERRING DIAGNOSIS:  Rectus diastasis [M62.08]   DATE OF ONSET: 7/3/17  REFERRING PHYSICIAN: Azam Bradshaw MD  RETURN PHYSICIAN APPOINTMENT: No f/u scheduled     INITIAL ASSESSMENT:  Ms. Joy Mcknight presents today with signs and symptoms consistent with the diagnosis of rectus diastesis secondary to pregnancy resulting in LBP and R hip pain from weak abdominals and hip musculature. She also presents with a forward L innominate rotation and rotational fault at L4-L5 due to core instability. Pt will benefit from skilled PT to address the following deficits. PROBLEM LIST (Impacting functional limitations):  1. Decreased Strength  2. Decreased ADL/Functional Activities  3. Decreased Transfer Abilities  4. Decreased Ambulation Ability/Technique  5. Decreased Balance  6. Increased Pain  7. Decreased Flexibility/Joint Mobility INTERVENTIONS PLANNED:  1. Balance Exercise  2. Bed Mobility  3. Cold  4. Cryotherapy  5. Electrical Stimulation  6. Heat  7. Home Exercise Program (HEP)  8. Manual Therapy  9. Neuromuscular Re-education/Strengthening  10. Range of Motion (ROM)  11. Therapeutic Exercise/Strengthening  12. Transfer Training   TREATMENT PLAN:  Effective Dates: 7/3/17 TO 9/3/17. Frequency/Duration: 2 times a week for 8 weeks  GOALS: (Goals have been discussed and agreed upon with patient.)  Short-Term Functional Goals: Time Frame: 4-6 weeks  1.  Pt will be compliant and independent with HEP, log rolling, car transfers and demonstrating correct, erect posture without an increase in back or hip pain. 2. Pt will improve LEFS score to 65/80 to increase pt's overall functional mobility. 3. Pt to subjectively report a decrease in pain to 1-2/10 @ worst to increase pt's ease with turning over in bed, and car transfers. 4. Pt will improve Lumbar AROM to Ext: 50% and B SB/ROT to 50% to increase pt's overall trunk mobility. 5. Pt will improve abdominal and B hip strength to > 3 to 3+/5 to allow pt to maintain neutral pelvic and lumbar alignment. Discharge Goals: Time Frame: 6-8 weeks  1. Pt will improve LEFS score to > 70/80 to increase pt's overall functional mobility. 2. Bed mobility and car transfers will be painfree. 3. Pt will improve Lumbar AROM to 75% of normal motion throughout with pain 1-2/10 at worst to increase pt's overall functional mobility. 4. Pt will return to gym activities, as well as, yoga/pilates maintaining normal pelvic and lumbar alignment with no incerase in LBP. 5. Pt will be discharged from PT to [facility]. Rehabilitation Potential For Stated Goals: Good  Regarding Jarod Ahuja's therapy, I certify that the treatment plan above will be carried out by a therapist or under their direction. Thank you for this referral,  Filiberto Lewis PT               The information in this section was collected on 7/3/17 (except where otherwise noted). HISTORY:   History of Present Injury/Illness (Reason for Referral):  Pt reports she's had LBP and abdominal soreness off and on for the last 4 years since her second child was born. She suffered a rectus diastesis with him. 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 She states she feels when she exercises, her back and hip pain increase.    Past Medical History/Comorbidities:   Ms. mAaya Andrews  has a past medical history of Abnormal Papanicolaou smear of cervix; Migraines; and Ovarian cyst.  Ms. Juve Moody  has a past surgical history that includes appendectomy;  section (2013); pelvic laparoscopy; cholecystectomy (2013); and colposcopy. Social History/Living Environment:     Pt lives in a 2 story home with her  and 2 children. She denies c/o LBP or hip pain with ascending/descending stairs. Prior Level of Function/Work/Activity:  Pt denies having rectus diastesis, abdominal weakness or LBP prior to her second pregnancy 4 years ago. Current Medications:       Current Outpatient Prescriptions:     butalbital-acetaminophen-caffeine (FIORICET, ESGIC) -40 mg per tablet, Take 1 Tab by mouth every six (6) hours as needed for Pain. Max Daily Amount: 4 Tabs., Disp: 20 Tab, Rfl: 3    loratadine (CLARITIN) 10 mg tablet, Take 10 mg by mouth daily as needed for Allergies. , Disp: , Rfl:    Date Last Reviewed:  7/3/17   EXAMINATION:   Observation/Orthostatic Postural Assessment:          Pt stands with increased weight through L LE off-weighting R. Pt denies c/o R side LBP or R hip pain with standing today. Palpation: pt minimally tender with palpation at R side L4-L5.   Posture/Deformity:  Lumbar AROM: % of normal motion in standing    Lumbar spine Date:  7/3/17 Date:   Date:     Direction  Parameters Parameters Parameters   Flexion  75%     Extension  25% w/ p     Rotation  R: 25% w/p  L: 25% w/p     Side bending  R: 25% w/p  L: 25% w/p     Hip R:WNL  L:WNL                   Strength Date:  7/3/17 Date:   Date:     Core/LE Parameters Parameters Parameters   Hip Flex R:3/5  L:3/5     Hip Ext R:3-/5  L:3-/5     Hip ABD R:3-/5  L:3-/5     Hip ADD R:3/5  L:3/5     Hip ER R:3/5  L:3/5     Hip IR R:3/5  L:3/5     Knee Ext R:4/5  L:4/5     Knee Flex R:3/5  L:3/5     Ankle DF R:4/5  L:4/5     Ankle PF R:4/5  L:4/5     Double Squat (L4) Able w/out UE A     Heel Walk (L5) Able w/out UE A     Toe Walk (S1) Able w/out UE A         Sensation: Normal and equal B throughout  Anterior Thigh (L3):  Medial Foot (L4):  Dorsal Foot (L5):  Lateral Foot (S1):    Special Test/Function:  Pelvic Obliquity: +; Pt with L anterior innominate rotation  Rotational Fault: FRSR @ L4 and L5  Accessory Mobility: Diminished L3-L5     CLINICAL PRESENTATION:   CLINICAL DECISION MAKING:   Outcome Measure:   LEFS: 59/80=74% Function; 26% Disability    Medical Necessity:   · Patient is expected to demonstrate progress in strength, range of motion, balance, coordination and functional technique to increase independence with returning to gym activities and classes safely. .  Reason for Services/Other Comments:  · Patient continues to require present interventions due to patient's inability to maintain neutral pelvic and lumbar alignment due to abdominal, hip and lumbar weakness. .            TREATMENT:   (In addition to Assessment/Re-Assessment sessions the following treatments were rendered)  Pre-treatment Symptoms/Complaints:  Pt reported mild soreness after last PT visit, but subjectively reported less hip and groing pain until 2 days ago. She states pain returned  and feels the same as she did when she came for her evaluation. She states some compliance with HEP. Pain: Initial:     4 Post Session:  3   MANUAL THERAPY: (25minutes): MET performed to correct pelvic obliquity  (L anterior innominate rotation) by engaging L HS and R quads. She also required gentle sacral and lumbar mobilizations (grade II and III) to correct mild rotational fault at L5 [facility]) and sacral torsion to the R.     THERAPEUTIC EXERCISE: (30 minutes):  Exercises per grid below to improve mobility and strength. Required moderate visual, verbal and tactile cues to promote proper body alignment, promote proper body posture and promote proper body breathing techniques. Progressed range and repetitions as indicated.      Date:  7/3/17 Date:  7/7/17 Date:     Activity/Exercise Parameters Parameters Parameters   TAs 10 x 10sec 10 x 5 sec    TAs w/ add sq 10 x 10sec 10 x 5 sec    TAs w/ add sq and bridging 10 x 10sec 10 x 5 sec    LTR  20x    Bridging w/ TA and band  RTB, 20x    clams 15x B     Piriformis stretch (post)  3 x 20sec B    Mod piriformis stretch (ant)  3 x 20sec B    S/L hip flexor str w/ strap  3 x 20sec B    HS/GS str 3 x 30sec B     Education Log-rolling and car transfers Use of ice and NSAIDS    MET for pelvic obliquity R quad/L HS done    Manual correction for L4/5 rot Done seated done                            Treatment/Session Assessment: Pt presented today with L ant innominate rotation and mild lumbar rotational fault at L5 and sacral torsion requiring MET and gentle mobilizations to normalize lumbar and pelvic alignment. Pt is very easy to mobilize due to ligamentous laxity from hormones and decreased stability due to abdominal and hip weakness. · Response to Treatment:  Pt's pelvic obliquity and rotational fault were easily corrected with MET and gentle mobilizations. She did well with the addition of above stretches. She fatigued very quickly with bridging with band due to abdominal and hip abductor weakness. · Compliance with Program/Exercises: Pt reports compliance with HEP 2-3x since her evaluation. · Recommendations/Intent for next treatment session: \"Next visit will focus on advancements to more challenging activities and reduction in assistance provided\". Advance TA activities, as well as, hip strengthening as pt is able.     Future Appointments  Date Time Provider Nila Luz   7/11/2017 8:00 AM MARIAM Carbone [facility]   7/14/2017 8:00 AM MARIAM Carbone [facility]   7/18/2017 8:00 AM MARIAM Carbone [facility]   [facility]2017 [facility]   7/25/2017 8:00 AM Gabo Hirsch PT [facility]   7/25/2017 11:30 AM [facility] ROOM 1 SFERMAM E   7/28/2017 8:00 AM MARIAM Carbone   2/23/2018 8:00 AM [facility] LAB [facility] 3/2/2018 10:00 AM Edmundo Gómez MD [facility]       Total Treatment Duration:PT Patient Time In/Time Out  Time In: 8180  Time Out: [address]  [facility],  No

## 2023-01-05 ENCOUNTER — APPOINTMENT (RX ONLY)
Dept: URBAN - METROPOLITAN AREA CLINIC 23 | Facility: CLINIC | Age: 48
Setting detail: DERMATOLOGY
End: 2023-01-05

## 2023-01-05 DIAGNOSIS — Z41.9 ENCOUNTER FOR PROCEDURE FOR PURPOSES OTHER THAN REMEDYING HEALTH STATE, UNSPECIFIED: ICD-10-CM

## 2023-01-05 PROCEDURE — ? DYSPORT

## 2023-01-05 PROCEDURE — ? MEDICAL CONSULTATION: DYSPORT

## 2023-01-05 ASSESSMENT — LOCATION SIMPLE DESCRIPTION DERM
LOCATION SIMPLE: INFERIOR FOREHEAD
LOCATION SIMPLE: GLABELLA
LOCATION SIMPLE: GLABELLA
LOCATION SIMPLE: RIGHT EYEBROW
LOCATION SIMPLE: LEFT FOREHEAD

## 2023-01-05 ASSESSMENT — LOCATION DETAILED DESCRIPTION DERM
LOCATION DETAILED: LEFT INFERIOR MEDIAL FOREHEAD
LOCATION DETAILED: INFERIOR MID FOREHEAD
LOCATION DETAILED: RIGHT MEDIAL EYEBROW
LOCATION DETAILED: GLABELLA
LOCATION DETAILED: GLABELLA

## 2023-01-05 ASSESSMENT — LOCATION ZONE DERM
LOCATION ZONE: FACE
LOCATION ZONE: FACE

## 2023-01-05 NOTE — PROCEDURE: DYSPORT
Levator Labii Superioris Units: 0
Detail Level: Detailed
Show Depressor Anguli Units: Yes
Show Right And Left Periorbital Units: No
Glabellar Complex Units: 25
Post-Care Instructions: Patient instructed to not lie down for 4 hours and limit physical activity for 24 hours. Patient instructed not to travel by airplane for 48 hours.
Dilution (U/0.1 Cc): 2.5
Expiration Date (Month Year): 01/2023
Lot #: [id number]
Consent: Written consent obtained. Risks include but not limited to lid/brow ptosis, bruising, swelling, diplopia, temporary effect, incomplete chemical denervation.
Show Inventory Tab: Show

## 2023-02-07 ENCOUNTER — PATIENT MESSAGE (OUTPATIENT)
Dept: NEUROLOGY | Age: 48
End: 2023-02-07

## 2023-02-07 DIAGNOSIS — G43.001 MIGRAINE WITHOUT AURA AND WITH STATUS MIGRAINOSUS, NOT INTRACTABLE: Primary | ICD-10-CM

## 2023-02-12 RX ORDER — BUTALBITAL, ACETAMINOPHEN AND CAFFEINE 50; 325; 40 MG/1; MG/1; MG/1
TABLET ORAL
Qty: 30 TABLET | Refills: 1 | Status: SHIPPED | OUTPATIENT
Start: 2023-02-12

## 2023-02-12 NOTE — TELEPHONE ENCOUNTER
From: Gareth Quinonez  To: Dr. Minnie Garland: 2/7/2023 1:33 PM EST  Subject: Fioricet for managing migraines    Stanford Lemus,    I saw you a few months ago to discuss migraine maintenance. I decided against taking the daily preventative medicine, because I was not comfortable with the side effects. I tried the Frova for an acute migraine and found that it helped ( although not as well as sumatriptan) and I had to take it a few days in a row and it caused me to have rebound headaches for a week. I have not taken it again. I still have the sumatriptan and take one very rarely for a bad migraine. I still find that for milder/ tension type headaches Fioricet and Advil combined work best. I am out of Fioricet ( last prescription for 30 tablets was 09/ 2022 from my obgyn). When I asked them for a refill they stated that since I was under neurology care for migraines they could not prescribe the Fioricet any longer. Could you please prescribe this for me? Could you also please tell me again which magnesium and b vitamin I should try preventatively? Thank you!

## 2023-02-12 NOTE — TELEPHONE ENCOUNTER
Requested Prescriptions     Signed Prescriptions Disp Refills    butalbital-acetaminophen-caffeine (FIORICET, ESGIC) -40 MG per tablet 30 tablet 1     Si bid prn for moderate to severe migraine. Do not take > 3 days/week regularly.      Authorizing Provider: Elizabeth Encarnacion Quality 138: Melanoma: Coordination Of Care: Treatment plan not communicated, reason not otherwise specified. Quality 131: Pain Assessment And Follow-Up: Pain assessment using a standardized tool is documented as negative, no follow-up plan required Quality 130: Documentation Of Current Medications In The Medical Record: Current Medications Documented Quality 110: Preventive Care And Screening: Influenza Immunization: Influenza Immunization Administered during Influenza season Quality 111:Pneumonia Vaccination Status For Older Adults: Pneumococcal Vaccination not Administered or Previously Received, Reason not Otherwise Specified Quality 431: Preventive Care And Screening: Unhealthy Alcohol Use - Screening: Patient screened for unhealthy alcohol use using a single question and scores less than 2 times per year Quality 47: Advance Care Plan: Advance Care Planning discussed and documented; advance care plan or surrogate decision maker documented in the medical record. Quality 137: Melanoma: Continuity Of Care - Recall System: Recall system not utilized, reason not otherwise specified Quality 226: Preventive Care And Screening: Tobacco Use: Screening And Cessation Intervention: Patient screened for tobacco use and is an ex/non-smoker Detail Level: Detailed

## 2023-04-05 ENCOUNTER — APPOINTMENT (RX ONLY)
Dept: URBAN - METROPOLITAN AREA CLINIC 24 | Facility: CLINIC | Age: 48
Setting detail: DERMATOLOGY
End: 2023-04-05

## 2023-04-05 DIAGNOSIS — Z41.9 ENCOUNTER FOR PROCEDURE FOR PURPOSES OTHER THAN REMEDYING HEALTH STATE, UNSPECIFIED: ICD-10-CM

## 2023-04-05 PROCEDURE — ? MEDICAL CONSULTATION: DYSPORT

## 2023-04-05 PROCEDURE — ? DYSPORT

## 2023-04-05 PROCEDURE — ? INVENTORY

## 2023-04-05 ASSESSMENT — LOCATION DETAILED DESCRIPTION DERM
LOCATION DETAILED: GLABELLA
LOCATION DETAILED: RIGHT MEDIAL EYEBROW
LOCATION DETAILED: GLABELLA
LOCATION DETAILED: INFERIOR MID FOREHEAD
LOCATION DETAILED: LEFT INFERIOR MEDIAL FOREHEAD

## 2023-04-05 ASSESSMENT — LOCATION SIMPLE DESCRIPTION DERM
LOCATION SIMPLE: RIGHT EYEBROW
LOCATION SIMPLE: GLABELLA
LOCATION SIMPLE: LEFT FOREHEAD
LOCATION SIMPLE: INFERIOR FOREHEAD
LOCATION SIMPLE: GLABELLA

## 2023-04-05 ASSESSMENT — LOCATION ZONE DERM
LOCATION ZONE: FACE
LOCATION ZONE: FACE

## 2023-04-05 NOTE — PROCEDURE: DYSPORT
Levator Labii Superioris Units: 0
Detail Level: Detailed
Show Depressor Anguli Units: Yes
Show Right And Left Periorbital Units: No
Glabellar Complex Units: 25
Post-Care Instructions: Patient instructed to not lie down for 4 hours and limit physical activity for 24 hours. Patient instructed not to travel by airplane for 48 hours.
Dilution (U/0.1 Cc): 2.5
Expiration Date (Month Year): 01/2023
Lot #: [id number]
Reconstitution Date (Optional): 04/05/23
Consent: Written consent obtained. Risks include but not limited to lid/brow ptosis, bruising, swelling, diplopia, temporary effect, incomplete chemical denervation.
Show Inventory Tab: Show

## 2023-07-14 ENCOUNTER — HOSPITAL ENCOUNTER (OUTPATIENT)
Dept: CT IMAGING | Age: 48
Discharge: HOME | End: 2023-07-14
Payer: COMMERCIAL

## 2023-07-14 ENCOUNTER — TRANSCRIBE ORDERS (OUTPATIENT)
Dept: SCHEDULING | Age: 48
End: 2023-07-14

## 2023-07-14 DIAGNOSIS — Z90.49 HX OF CHOLECYSTECTOMY: ICD-10-CM

## 2023-07-14 DIAGNOSIS — Z87.19 HISTORY OF DIVERTICULITIS: ICD-10-CM

## 2023-07-14 DIAGNOSIS — R11.2 NAUSEA AND VOMITING, UNSPECIFIED VOMITING TYPE: ICD-10-CM

## 2023-07-14 DIAGNOSIS — R19.5 LOOSE STOOLS: ICD-10-CM

## 2023-07-14 DIAGNOSIS — R10.32 ABDOMINAL PAIN, LEFT LOWER QUADRANT: Primary | ICD-10-CM

## 2023-07-14 DIAGNOSIS — R10.32 ABDOMINAL PAIN, LEFT LOWER QUADRANT: ICD-10-CM

## 2023-07-14 PROCEDURE — 6360000004 HC RX CONTRAST MEDICATION: Performed by: INTERNAL MEDICINE

## 2023-07-14 PROCEDURE — 74177 CT ABD & PELVIS W/CONTRAST: CPT

## 2023-07-14 PROCEDURE — 2580000003 HC RX 258: Performed by: INTERNAL MEDICINE

## 2023-07-14 RX ORDER — SODIUM CHLORIDE 0.9 % (FLUSH) 0.9 %
10 SYRINGE (ML) INJECTION
Status: COMPLETED | OUTPATIENT
Start: 2023-07-14 | End: 2023-07-14

## 2023-07-14 RX ORDER — 0.9 % SODIUM CHLORIDE 0.9 %
100 INTRAVENOUS SOLUTION INTRAVENOUS
Status: COMPLETED | OUTPATIENT
Start: 2023-07-14 | End: 2023-07-14

## 2023-07-14 RX ADMIN — IOPAMIDOL 100 ML: 755 INJECTION, SOLUTION INTRAVENOUS at 14:39

## 2023-07-14 RX ADMIN — DIATRIZOATE MEGLUMINE AND DIATRIZOATE SODIUM 15 ML: 660; 100 LIQUID ORAL; RECTAL at 14:39

## 2023-07-14 RX ADMIN — SODIUM CHLORIDE, PRESERVATIVE FREE 10 ML: 5 INJECTION INTRAVENOUS at 14:40

## 2023-07-14 RX ADMIN — SODIUM CHLORIDE 100 ML: 9 INJECTION, SOLUTION INTRAVENOUS at 14:39

## 2023-08-14 RX ORDER — DROSPIRENONE 4 MG/1
1 TABLET, FILM COATED ORAL DAILY
Qty: 28 TABLET | Refills: 11 | OUTPATIENT
Start: 2023-08-14

## 2023-08-14 RX ORDER — DROSPIRENONE 4 MG/1
1 TABLET, FILM COATED ORAL DAILY
Qty: 28 TABLET | Refills: 2 | Status: SHIPPED | OUTPATIENT
Start: 2023-08-14

## 2023-08-14 NOTE — TELEPHONE ENCOUNTER
GYN patient called requesting a refill on her OCP (Slynd). WWE scheduled 10/9/23. Prescription sent to pharmacy per standing orders.     Orders Placed This Encounter    Drospirenone (SLYND) 4 MG TABS     Sig: Take 1 tablet by mouth daily     Dispense:  28 tablet     Refill:  2

## 2023-10-10 ENCOUNTER — OFFICE VISIT (OUTPATIENT)
Dept: OBGYN CLINIC | Age: 48
End: 2023-10-10
Payer: COMMERCIAL

## 2023-10-10 VITALS
DIASTOLIC BLOOD PRESSURE: 70 MMHG | HEIGHT: 65 IN | SYSTOLIC BLOOD PRESSURE: 104 MMHG | WEIGHT: 134.9 LBS | BODY MASS INDEX: 22.48 KG/M2

## 2023-10-10 DIAGNOSIS — Z13.89 SCREENING FOR GENITOURINARY CONDITION: ICD-10-CM

## 2023-10-10 DIAGNOSIS — Z12.31 ENCOUNTER FOR SCREENING MAMMOGRAM FOR MALIGNANT NEOPLASM OF BREAST: ICD-10-CM

## 2023-10-10 DIAGNOSIS — Z11.51 SCREENING FOR HUMAN PAPILLOMAVIRUS (HPV): ICD-10-CM

## 2023-10-10 DIAGNOSIS — Z01.419 WELL WOMAN EXAM: Primary | ICD-10-CM

## 2023-10-10 DIAGNOSIS — Z12.4 SCREENING FOR MALIGNANT NEOPLASM OF CERVIX: ICD-10-CM

## 2023-10-10 LAB
BILIRUBIN, URINE, POC: NEGATIVE
BLOOD URINE, POC: NORMAL
GLUCOSE URINE, POC: NEGATIVE
KETONES, URINE, POC: NEGATIVE
LEUKOCYTE ESTERASE, URINE, POC: NEGATIVE
NITRITE, URINE, POC: NEGATIVE
PH, URINE, POC: 6.5 (ref 4.6–8)
PROTEIN,URINE, POC: NEGATIVE
SPECIFIC GRAVITY, URINE, POC: 1.01 (ref 1–1.03)
URINALYSIS CLARITY, POC: CLEAR
URINALYSIS COLOR, POC: YELLOW
UROBILINOGEN, POC: NORMAL

## 2023-10-10 PROCEDURE — 81002 URINALYSIS NONAUTO W/O SCOPE: CPT | Performed by: OBSTETRICS & GYNECOLOGY

## 2023-10-10 PROCEDURE — 99396 PREV VISIT EST AGE 40-64: CPT | Performed by: OBSTETRICS & GYNECOLOGY

## 2023-10-10 NOTE — PROGRESS NOTES
HPI:  Ms. Mariza Garzon is a 50 y.o. female [id number] who is here today for a well woman exam. She complains of nothing. Hx of migraines -seeing neurology. Tried frova  - really uses imitrex. Had not tried topamax. Migraines are not lasting as long monthly. Advil plus fioricet best tx. Mostly related to tension. Date Performed Result   PAP 21 Nilm, HPV neg    Mammogram 22 Negative    Colonoscopy 20 Diverticulosis, hemorrhoids Was told to come back in 5yrs   Had recent GI flare so scheduled 2023   Dexa N/A        OB History          4    Para   2    Term   2            AB   2    Living   2         SAB   2    IAB        Ectopic        Molar        Multiple        Live Births   2            Her kids are 8 and 11yo. Santiago Chawla / Alberto Harrell  - [employer]  / coding. GYN History     Patient's last menstrual period was 10/08/2023. Cycles are monthly, lasting a few days and light spotting. Sexually active using Slynd for birth control.      Past Medical History:   Diagnosis Date    Abnormal Papanicolaou smear of cervix     Cystocele     Rectocele    Diverticulitis     Migraines     Missed  2017    D&C    Ovarian cyst     SAB (spontaneous )     Vaginal delivery     x1    Vitamin D deficiency      Past Surgical History:   Procedure Laterality Date     SECTION  8/20/2013    X1    CHOLECYSTECTOMY  2013    COLPOSCOPY      DILATION AND CURETTAGE OF UTERUS      OTHER SURGICAL HISTORY      IVF    PELVIC LAPAROSCOPY         No Known Allergies    Current Outpatient Medications   Medication Sig Dispense Refill    Drospirenone (SLYND) 4 MG TABS Take 1 tablet by mouth daily 28 tablet 2    butalbital-acetaminophen-caffeine (FIORICET, ESGIC) -40 MG per tablet Take 1 tablet by mouth every 6 hours as needed      ibuprofen (ADVIL;MOTRIN) 200 MG tablet Take by mouth      loratadine (CLARITIN) 10 MG tablet Take 1 tablet by mouth daily as needed

## 2023-10-16 ENCOUNTER — APPOINTMENT (RX ONLY)
Dept: URBAN - METROPOLITAN AREA CLINIC 329 | Facility: CLINIC | Age: 48
Setting detail: DERMATOLOGY
End: 2023-10-16

## 2023-10-16 DIAGNOSIS — Z12.83 ENCOUNTER FOR SCREENING FOR MALIGNANT NEOPLASM OF SKIN: ICD-10-CM

## 2023-10-16 DIAGNOSIS — D22 MELANOCYTIC NEVI: ICD-10-CM

## 2023-10-16 DIAGNOSIS — L81.4 OTHER MELANIN HYPERPIGMENTATION: ICD-10-CM

## 2023-10-16 DIAGNOSIS — D18.0 HEMANGIOMA: ICD-10-CM

## 2023-10-16 DIAGNOSIS — Z71.89 OTHER SPECIFIED COUNSELING: ICD-10-CM

## 2023-10-16 DIAGNOSIS — L57.8 OTHER SKIN CHANGES DUE TO CHRONIC EXPOSURE TO NONIONIZING RADIATION: ICD-10-CM | Status: STABLE

## 2023-10-16 DIAGNOSIS — L82.1 OTHER SEBORRHEIC KERATOSIS: ICD-10-CM

## 2023-10-16 PROBLEM — D18.01 HEMANGIOMA OF SKIN AND SUBCUTANEOUS TISSUE: Status: ACTIVE | Noted: 2023-10-16

## 2023-10-16 PROBLEM — D22.5 MELANOCYTIC NEVI OF TRUNK: Status: ACTIVE | Noted: 2023-10-16

## 2023-10-16 PROBLEM — D22.72 MELANOCYTIC NEVI OF LEFT LOWER LIMB, INCLUDING HIP: Status: ACTIVE | Noted: 2023-10-16

## 2023-10-16 PROBLEM — D22.71 MELANOCYTIC NEVI OF RIGHT LOWER LIMB, INCLUDING HIP: Status: ACTIVE | Noted: 2023-10-16

## 2023-10-16 PROBLEM — D22.61 MELANOCYTIC NEVI OF RIGHT UPPER LIMB, INCLUDING SHOULDER: Status: ACTIVE | Noted: 2023-10-16

## 2023-10-16 PROCEDURE — ? FULL BODY SKIN EXAM

## 2023-10-16 PROCEDURE — ? SUNSCREEN RECOMMENDATIONS

## 2023-10-16 PROCEDURE — 99203 OFFICE O/P NEW LOW 30 MIN: CPT

## 2023-10-16 PROCEDURE — ? TREATMENT REGIMEN

## 2023-10-16 PROCEDURE — ? COUNSELING

## 2023-10-16 ASSESSMENT — LOCATION DETAILED DESCRIPTION DERM
LOCATION DETAILED: UPPER STERNUM
LOCATION DETAILED: MID-FRONTAL SCALP
LOCATION DETAILED: RIGHT ANTERIOR DISTAL UPPER ARM
LOCATION DETAILED: RIGHT SUPERIOR MEDIAL UPPER BACK
LOCATION DETAILED: LEFT CENTRAL MALAR CHEEK
LOCATION DETAILED: LEFT ANTERIOR DISTAL UPPER ARM
LOCATION DETAILED: RIGHT CLAVICULAR SKIN
LOCATION DETAILED: LEFT INFERIOR UPPER BACK
LOCATION DETAILED: RIGHT PROXIMAL DORSAL FOREARM
LOCATION DETAILED: LEFT DISTAL POSTERIOR THIGH
LOCATION DETAILED: SUPERIOR THORACIC SPINE
LOCATION DETAILED: RIGHT ANTERIOR DISTAL THIGH
LOCATION DETAILED: LEFT DISTAL CALF
LOCATION DETAILED: RIGHT VENTRAL DISTAL FOREARM
LOCATION DETAILED: LEFT PROXIMAL POSTERIOR THIGH
LOCATION DETAILED: EPIGASTRIC SKIN

## 2023-10-16 ASSESSMENT — LOCATION SIMPLE DESCRIPTION DERM
LOCATION SIMPLE: ANTERIOR SCALP
LOCATION SIMPLE: ABDOMEN
LOCATION SIMPLE: CHEST
LOCATION SIMPLE: LEFT UPPER ARM
LOCATION SIMPLE: RIGHT UPPER ARM
LOCATION SIMPLE: LEFT UPPER BACK
LOCATION SIMPLE: RIGHT THIGH
LOCATION SIMPLE: RIGHT UPPER BACK
LOCATION SIMPLE: LEFT CALF
LOCATION SIMPLE: RIGHT CLAVICULAR SKIN
LOCATION SIMPLE: RIGHT FOREARM
LOCATION SIMPLE: UPPER BACK
LOCATION SIMPLE: LEFT CHEEK
LOCATION SIMPLE: LEFT POSTERIOR THIGH

## 2023-10-16 ASSESSMENT — LOCATION ZONE DERM
LOCATION ZONE: TRUNK
LOCATION ZONE: SCALP
LOCATION ZONE: FACE
LOCATION ZONE: LEG
LOCATION ZONE: ARM

## 2023-10-16 NOTE — HPI: SKIN LESION
What Type Of Note Output Would You Prefer (Optional)?: Bullet Format
How Severe Is Your Skin Lesion?: mild
Has Your Skin Lesion Been Treated?: not been treated
Is This A New Presentation, Or A Follow-Up?: Skin Lesions
Additional History: Patient reports that she has a few general skin lesions she would like to have evaluated.

## 2023-11-03 RX ORDER — DROSPIRENONE 4 MG/1
1 TABLET, FILM COATED ORAL DAILY
Qty: 28 TABLET | Refills: 2 | OUTPATIENT
Start: 2023-11-03

## 2023-11-07 RX ORDER — DROSPIRENONE 4 MG/1
1 TABLET, FILM COATED ORAL DAILY
Qty: 28 TABLET | Refills: 2 | OUTPATIENT
Start: 2023-11-07

## 2023-11-08 RX ORDER — DROSPIRENONE 4 MG/1
1 TABLET, FILM COATED ORAL DAILY
Qty: 28 TABLET | Refills: 2 | Status: CANCELLED | OUTPATIENT
Start: 2023-11-08

## 2023-11-13 ENCOUNTER — PATIENT MESSAGE (OUTPATIENT)
Dept: OBGYN CLINIC | Age: 48
End: 2023-11-13

## 2023-11-13 RX ORDER — DROSPIRENONE 4 MG/1
1 TABLET, FILM COATED ORAL DAILY
Qty: 28 TABLET | Refills: 10 | Status: SHIPPED | OUTPATIENT
Start: 2023-11-13

## 2023-11-13 NOTE — TELEPHONE ENCOUNTER
Prescription sent to pharmacy. Last [facility] 10/10/23.       Orders Placed This Encounter    Drospirenone (SLYND) 4 MG TABS     Sig: Take 1 tablet by mouth daily     Dispense:  28 tablet     Refill:  10

## 2024-01-22 ENCOUNTER — PATIENT MESSAGE (OUTPATIENT)
Dept: OBGYN CLINIC | Age: 49
End: 2024-01-22

## 2024-01-24 RX ORDER — BUTALBITAL, ACETAMINOPHEN AND CAFFEINE 50; 325; 40 MG/1; MG/1; MG/1
1 TABLET ORAL EVERY 4 HOURS PRN
Qty: 30 TABLET | Refills: 1 | Status: SHIPPED | OUTPATIENT
Start: 2024-01-24

## 2024-02-24 ENCOUNTER — HOSPITAL ENCOUNTER (OUTPATIENT)
Dept: MAMMOGRAPHY | Age: 49
End: 2024-02-24
Attending: OBSTETRICS & GYNECOLOGY
Payer: COMMERCIAL

## 2024-02-24 DIAGNOSIS — Z12.31 ENCOUNTER FOR SCREENING MAMMOGRAM FOR MALIGNANT NEOPLASM OF BREAST: ICD-10-CM

## 2024-02-24 PROCEDURE — 77063 BREAST TOMOSYNTHESIS BI: CPT

## 2024-04-01 ENCOUNTER — HOSPITAL ENCOUNTER (OUTPATIENT)
Dept: MAMMOGRAPHY | Age: 49
Discharge: HOME | End: 2024-04-04
Attending: OBSTETRICS & GYNECOLOGY
Payer: COMMERCIAL

## 2024-04-01 DIAGNOSIS — R92.8 ABNORMAL SCREENING MAMMOGRAM: ICD-10-CM

## 2024-04-01 PROCEDURE — G0279 TOMOSYNTHESIS, MAMMO: HCPCS

## 2024-04-01 PROCEDURE — 76642 ULTRASOUND BREAST LIMITED: CPT

## 2024-07-21 ENCOUNTER — PATIENT MESSAGE (OUTPATIENT)
Dept: OBGYN CLINIC | Age: 49
End: 2024-07-21

## 2024-07-22 NOTE — TELEPHONE ENCOUNTER
From: Gisele Yañez  To: Dr. Oly Bean  Sent: 7/21/2024 1:59 PM EDT  Subject: Sumatriptan    Hi Dr. Bean. I have run out of Sumatriptan and would like to request a new prescription. I use it only very sparingly in case of a bad migraine, which is why my prescription lasted as long as it did! I am going on vacation next Saturday and want to make sure I have one with me just in case.  Thank you in advance!  Sincerely,  Gisele Yañez

## 2024-07-23 RX ORDER — SUMATRIPTAN 50 MG/1
50 TABLET, FILM COATED ORAL PRN
Qty: 9 TABLET | Refills: 0 | Status: SHIPPED | OUTPATIENT
Start: 2024-07-23

## 2024-08-26 ENCOUNTER — OFFICE VISIT (OUTPATIENT)
Dept: OBGYN CLINIC | Age: 49
End: 2024-08-26
Payer: COMMERCIAL

## 2024-08-26 VITALS
WEIGHT: 134.1 LBS | SYSTOLIC BLOOD PRESSURE: 122 MMHG | DIASTOLIC BLOOD PRESSURE: 80 MMHG | HEIGHT: 65 IN | BODY MASS INDEX: 22.34 KG/M2

## 2024-08-26 DIAGNOSIS — R30.0 DYSURIA: Primary | ICD-10-CM

## 2024-08-26 DIAGNOSIS — R39.15 URINARY URGENCY: ICD-10-CM

## 2024-08-26 LAB
BILIRUBIN, URINE, POC: NEGATIVE
BLOOD URINE, POC: NEGATIVE
GLUCOSE URINE, POC: NEGATIVE
KETONES, URINE, POC: NEGATIVE
LEUKOCYTE ESTERASE, URINE, POC: NEGATIVE
NITRITE, URINE, POC: NEGATIVE
PH, URINE, POC: 6 (ref 4.6–8)
PROTEIN,URINE, POC: NEGATIVE
SPECIFIC GRAVITY, URINE, POC: 1.02 (ref 1–1.03)
URINALYSIS CLARITY, POC: CLEAR
URINALYSIS COLOR, POC: YELLOW
UROBILINOGEN, POC: NORMAL

## 2024-08-26 PROCEDURE — 81002 URINALYSIS NONAUTO W/O SCOPE: CPT | Performed by: NURSE PRACTITIONER

## 2024-08-26 PROCEDURE — 99213 OFFICE O/P EST LOW 20 MIN: CPT | Performed by: NURSE PRACTITIONER

## 2024-08-26 NOTE — PROGRESS NOTES
The patient is a 48 y.o.  who is seen for possible yeast infection VS UTI. Urinalysis today is completely normal.     She was in [place] on vacation and started having stomach issues so GI gave her antibiotic for possible diverticulitis.   Also while on vacation she started having frequency with urination and burning. Symptoms started on 8/3/24. She notes she self txd with an abx on vacation and urinary sx improved.   Seen at Urgent care on 24, recommended stopping antibiotic and was prescribed Diflucan 150 mg x 1 tablet.   Results came back from vaginal swab and were negative 24.   Today she still has the feeling of urgency and uncomfortable feeling at urethra. No pelvic pain.  Denies fever/ chills. Denies vaginal discharge. Denies itching. Denies perineal odor. Denies new sex partner.     No blood in urine, no flank pain, no nausea.     Came off OCP's about a year ago.   Patient states her last full period was about 2 months ago.   Had some spotting x 3 days on 24.     HISTORY:    Patient's last menstrual period was 2024 (exact date).  Sexual History:  has sex with males  Contraception:  none  Current Outpatient Medications on File Prior to Visit   Medication Sig Dispense Refill    SUMAtriptan (IMITREX) 50 MG tablet Take 1 tablet by mouth as needed for Migraine Take 1 tablet at onset of migraine, can repeat 2 hrs later if no sig improvement. Max dose 2 tab/day 9 tablet 0    butalbital-acetaminophen-caffeine (FIORICET, ESGIC) -40 MG per tablet Take 1 tablet by mouth every 4 hours as needed for Headaches 30 tablet 1    ibuprofen (ADVIL;MOTRIN) 200 MG tablet Take by mouth      loratadine (CLARITIN) 10 MG tablet Take 1 tablet by mouth daily as needed       No current facility-administered medications on file prior to visit.       ROS:  Feeling well. No dyspnea or chest pain on exertion.  No abdominal pain, change in bowel habits, black or bloody stools.  No urinary tract symptoms.

## 2024-08-28 LAB
BACTERIA SPEC CULT: NORMAL
SERVICE CMNT-IMP: NORMAL

## 2024-08-29 DIAGNOSIS — R30.0 DYSURIA: ICD-10-CM

## 2024-08-29 DIAGNOSIS — R39.15 URINARY URGENCY: Primary | ICD-10-CM

## 2025-04-09 ENCOUNTER — APPOINTMENT (OUTPATIENT)
Dept: URBAN - METROPOLITAN AREA CLINIC 329 | Facility: CLINIC | Age: 50
Setting detail: DERMATOLOGY
End: 2025-04-09

## 2025-04-09 DIAGNOSIS — Z71.89 OTHER SPECIFIED COUNSELING: ICD-10-CM

## 2025-04-09 DIAGNOSIS — D18.0 HEMANGIOMA: ICD-10-CM

## 2025-04-09 DIAGNOSIS — L82.1 OTHER SEBORRHEIC KERATOSIS: ICD-10-CM

## 2025-04-09 DIAGNOSIS — L81.4 OTHER MELANIN HYPERPIGMENTATION: ICD-10-CM

## 2025-04-09 DIAGNOSIS — L57.8 OTHER SKIN CHANGES DUE TO CHRONIC EXPOSURE TO NONIONIZING RADIATION: ICD-10-CM

## 2025-04-09 DIAGNOSIS — D22 MELANOCYTIC NEVI: ICD-10-CM

## 2025-04-09 DIAGNOSIS — Z12.83 ENCOUNTER FOR SCREENING FOR MALIGNANT NEOPLASM OF SKIN: ICD-10-CM

## 2025-04-09 PROBLEM — D22.61 MELANOCYTIC NEVI OF RIGHT UPPER LIMB, INCLUDING SHOULDER: Status: ACTIVE | Noted: 2025-04-09

## 2025-04-09 PROBLEM — D22.5 MELANOCYTIC NEVI OF TRUNK: Status: ACTIVE | Noted: 2025-04-09

## 2025-04-09 PROBLEM — D22.72 MELANOCYTIC NEVI OF LEFT LOWER LIMB, INCLUDING HIP: Status: ACTIVE | Noted: 2025-04-09

## 2025-04-09 PROBLEM — D18.01 HEMANGIOMA OF SKIN AND SUBCUTANEOUS TISSUE: Status: ACTIVE | Noted: 2025-04-09

## 2025-04-09 PROBLEM — D22.39 MELANOCYTIC NEVI OF OTHER PARTS OF FACE: Status: ACTIVE | Noted: 2025-04-09

## 2025-04-09 PROBLEM — D22.71 MELANOCYTIC NEVI OF RIGHT LOWER LIMB, INCLUDING HIP: Status: ACTIVE | Noted: 2025-04-09

## 2025-04-09 PROCEDURE — 99213 OFFICE O/P EST LOW 20 MIN: CPT

## 2025-04-09 PROCEDURE — ? COUNSELING

## 2025-04-09 PROCEDURE — ? FULL BODY SKIN EXAM

## 2025-04-09 PROCEDURE — ? SUNSCREEN RECOMMENDATIONS

## 2025-04-09 PROCEDURE — ? TREATMENT REGIMEN

## 2025-04-09 ASSESSMENT — LOCATION DETAILED DESCRIPTION DERM
LOCATION DETAILED: EPIGASTRIC SKIN
LOCATION DETAILED: LEFT PROXIMAL POSTERIOR THIGH
LOCATION DETAILED: LEFT DISTAL POSTERIOR THIGH
LOCATION DETAILED: LEFT ANTERIOR DISTAL UPPER ARM
LOCATION DETAILED: LEFT DISTAL CALF
LOCATION DETAILED: INFERIOR MID FOREHEAD
LOCATION DETAILED: RIGHT ANTERIOR DISTAL UPPER ARM
LOCATION DETAILED: LEFT CENTRAL MALAR CHEEK
LOCATION DETAILED: UPPER STERNUM
LOCATION DETAILED: SUPERIOR THORACIC SPINE
LOCATION DETAILED: RIGHT VENTRAL DISTAL FOREARM
LOCATION DETAILED: RIGHT PROXIMAL DORSAL FOREARM
LOCATION DETAILED: LEFT INFERIOR UPPER BACK
LOCATION DETAILED: RIGHT SUPERIOR MEDIAL UPPER BACK
LOCATION DETAILED: MID-FRONTAL SCALP
LOCATION DETAILED: RIGHT CLAVICULAR SKIN
LOCATION DETAILED: RIGHT ANTERIOR DISTAL THIGH

## 2025-04-09 ASSESSMENT — LOCATION SIMPLE DESCRIPTION DERM
LOCATION SIMPLE: LEFT UPPER BACK
LOCATION SIMPLE: RIGHT UPPER BACK
LOCATION SIMPLE: RIGHT UPPER ARM
LOCATION SIMPLE: RIGHT THIGH
LOCATION SIMPLE: RIGHT CLAVICULAR SKIN
LOCATION SIMPLE: LEFT CHEEK
LOCATION SIMPLE: UPPER BACK
LOCATION SIMPLE: RIGHT FOREARM
LOCATION SIMPLE: LEFT CALF
LOCATION SIMPLE: CHEST
LOCATION SIMPLE: LEFT POSTERIOR THIGH
LOCATION SIMPLE: ANTERIOR SCALP
LOCATION SIMPLE: ABDOMEN
LOCATION SIMPLE: INFERIOR FOREHEAD
LOCATION SIMPLE: LEFT UPPER ARM

## 2025-04-09 ASSESSMENT — LOCATION ZONE DERM
LOCATION ZONE: SCALP
LOCATION ZONE: ARM
LOCATION ZONE: LEG
LOCATION ZONE: FACE
LOCATION ZONE: TRUNK

## 2025-04-09 NOTE — PROCEDURE: SUNSCREEN RECOMMENDATIONS
General Sunscreen Counseling: I recommended a broad spectrum sunscreen with a SPF of 30 or higher.  I explained that SPF 30 sunscreens block approximately 97 percent of the sun's harmful rays.  Sunscreens should be applied at least 15 minutes prior to expected sun exposure and then every 2 hours after that as long as sun exposure continues. If swimming or exercising sunscreen should be reapplied every 45 minutes to an hour after getting wet or sweating.  One ounce, or the equivalent of a shot glass full of sunscreen, is adequate to protect the skin not covered by a bathing suit. I also recommended a lip balm with a sunscreen as well. Sun protective clothing can be used in lieu of sunscreen but must be worn the entire time you are exposed to the sun's rays.
Detail Level: Detailed
Cellcept Counseling:  I discussed with the patient the risks of mycophenolate mofetil including but not limited to infection/immunosuppression, GI upset, hypokalemia, hypercholesterolemia, bone marrow suppression, lymphoproliferative disorders, malignancy, GI ulceration/bleed/perforation, colitis, interstitial lung disease, kidney failure, progressive multifocal leukoencephalopathy, and birth defects.  The patient understands that monitoring is required including a baseline creatinine and regular CBC testing. In addition, patient must alert us immediately if symptoms of infection or other concerning signs are noted.

## 2025-05-16 RX ORDER — BUTALBITAL, ACETAMINOPHEN AND CAFFEINE 50; 325; 40 MG/1; MG/1; MG/1
1 TABLET ORAL EVERY 4 HOURS PRN
Qty: 30 TABLET | Refills: 1 | OUTPATIENT
Start: 2025-05-16

## 2025-05-16 RX ORDER — SUMATRIPTAN 50 MG/1
50 TABLET, FILM COATED ORAL PRN
Qty: 9 TABLET | Refills: 0 | OUTPATIENT
Start: 2025-05-16

## 2025-05-17 ENCOUNTER — HOSPITAL ENCOUNTER (OUTPATIENT)
Dept: MAMMOGRAPHY | Age: 50
Discharge: HOME | End: 2025-05-20
Payer: COMMERCIAL

## 2025-05-17 DIAGNOSIS — Z12.31 ENCOUNTER FOR SCREENING MAMMOGRAM FOR BREAST CANCER: ICD-10-CM

## 2025-05-17 PROCEDURE — 77067 SCR MAMMO BI INCL CAD: CPT

## 2025-05-22 RX ORDER — SUMATRIPTAN 50 MG/1
50 TABLET, FILM COATED ORAL PRN
Qty: 9 TABLET | Refills: 0 | Status: SHIPPED | OUTPATIENT
Start: 2025-05-22

## 2025-05-22 RX ORDER — BUTALBITAL, ACETAMINOPHEN AND CAFFEINE 50; 325; 40 MG/1; MG/1; MG/1
1 TABLET ORAL EVERY 4 HOURS PRN
Qty: 30 TABLET | Refills: 1 | OUTPATIENT
Start: 2025-05-22

## 2025-06-09 ENCOUNTER — HOSPITAL ENCOUNTER (OUTPATIENT)
Dept: MAMMOGRAPHY | Age: 50
Discharge: HOME | End: 2025-06-12
Attending: INTERNAL MEDICINE
Payer: COMMERCIAL

## 2025-06-09 DIAGNOSIS — R92.8 ABNORMAL SCREENING MAMMOGRAM: ICD-10-CM

## 2025-06-09 PROCEDURE — 76642 ULTRASOUND BREAST LIMITED: CPT

## 2025-06-09 PROCEDURE — G0279 TOMOSYNTHESIS, MAMMO: HCPCS

## 2025-06-12 ENCOUNTER — HOSPITAL ENCOUNTER (OUTPATIENT)
Dept: MAMMOGRAPHY | Age: 50
Discharge: HOME | End: 2025-06-15
Payer: COMMERCIAL

## 2025-06-12 DIAGNOSIS — R92.8 ABNORMAL FINDING ON MAMMOGRAPHY: ICD-10-CM

## 2025-06-12 PROCEDURE — 88305 TISSUE EXAM BY PATHOLOGIST: CPT

## 2025-06-12 PROCEDURE — 6360000002 HC RX W HCPCS: Performed by: INTERNAL MEDICINE

## 2025-06-12 PROCEDURE — 88360 TUMOR IMMUNOHISTOCHEM/MANUAL: CPT

## 2025-06-12 PROCEDURE — 19081 BX BREAST 1ST LESION STRTCTC: CPT

## 2025-06-12 PROCEDURE — 76098 X-RAY EXAM SURGICAL SPECIMEN: CPT

## 2025-06-12 PROCEDURE — 77065 DX MAMMO INCL CAD UNI: CPT

## 2025-06-12 PROCEDURE — 2500000003 HC RX 250 WO HCPCS: Performed by: INTERNAL MEDICINE

## 2025-06-12 RX ORDER — LIDOCAINE HYDROCHLORIDE AND EPINEPHRINE 10; 10 MG/ML; UG/ML
10 INJECTION, SOLUTION INFILTRATION; PERINEURAL ONCE
Status: COMPLETED | OUTPATIENT
Start: 2025-06-12 | End: 2025-06-12

## 2025-06-12 RX ORDER — MAGNESIUM HYDROXIDE 1200 MG/15ML
250 LIQUID ORAL ONCE
Status: COMPLETED | OUTPATIENT
Start: 2025-06-12 | End: 2025-06-12

## 2025-06-12 RX ORDER — LIDOCAINE HYDROCHLORIDE 10 MG/ML
5 INJECTION, SOLUTION INFILTRATION; PERINEURAL ONCE
Status: COMPLETED | OUTPATIENT
Start: 2025-06-12 | End: 2025-06-12

## 2025-06-12 RX ORDER — LIDOCAINE HYDROCHLORIDE AND EPINEPHRINE 10; 10 MG/ML; UG/ML
5 INJECTION, SOLUTION INFILTRATION; PERINEURAL ONCE
Status: COMPLETED | OUTPATIENT
Start: 2025-06-12 | End: 2025-06-12

## 2025-06-12 RX ADMIN — LIDOCAINE HYDROCHLORIDE 1 ML: 10 INJECTION, SOLUTION INFILTRATION; PERINEURAL at 13:39

## 2025-06-12 RX ADMIN — LIDOCAINE HYDROCHLORIDE,EPINEPHRINE BITARTRATE 5 ML: 10; .01 INJECTION, SOLUTION INFILTRATION; PERINEURAL at 13:41

## 2025-06-12 RX ADMIN — SODIUM CHLORIDE 250 ML: 900 IRRIGANT IRRIGATION at 13:41

## 2025-06-12 RX ADMIN — LIDOCAINE HYDROCHLORIDE,EPINEPHRINE BITARTRATE 10 ML: 10; .01 INJECTION, SOLUTION INFILTRATION; PERINEURAL at 13:40

## 2025-06-12 ASSESSMENT — PAIN - FUNCTIONAL ASSESSMENT: PAIN_FUNCTIONAL_ASSESSMENT: 0-10

## 2025-06-16 ENCOUNTER — HOSPITAL ENCOUNTER (OUTPATIENT)
Dept: MRI IMAGING | Age: 50
Discharge: HOME | End: 2025-06-19
Payer: COMMERCIAL

## 2025-06-16 ENCOUNTER — CLINICAL DOCUMENTATION (OUTPATIENT)
Dept: MAMMOGRAPHY | Age: 50
End: 2025-06-16

## 2025-06-16 ENCOUNTER — TELEPHONE (OUTPATIENT)
Dept: CASE MANAGEMENT | Age: 50
End: 2025-06-16

## 2025-06-16 DIAGNOSIS — C50.911 INFILTRATING DUCTAL CARCINOMA OF BREAST, RIGHT (HCC): ICD-10-CM

## 2025-06-16 DIAGNOSIS — D05.11 DUCTAL CARCINOMA IN SITU (DCIS) OF RIGHT BREAST: ICD-10-CM

## 2025-06-16 PROCEDURE — C8908 MRI W/O FOL W/CONT, BREAST,: HCPCS

## 2025-06-16 PROCEDURE — 6360000004 HC RX CONTRAST MEDICATION: Performed by: INTERNAL MEDICINE

## 2025-06-16 PROCEDURE — A9579 GAD-BASE MR CONTRAST NOS,1ML: HCPCS | Performed by: INTERNAL MEDICINE

## 2025-06-16 RX ORDER — GADOTERIDOL 279.3 MG/ML
12 INJECTION INTRAVENOUS
Status: COMPLETED | OUTPATIENT
Start: 2025-06-16 | End: 2025-06-16

## 2025-06-16 RX ADMIN — GADOTERIDOL 12 ML: 279.3 INJECTION, SOLUTION INTRAVENOUS at 18:08

## 2025-06-16 NOTE — TELEPHONE ENCOUNTER
Attempted navigation intake. Left my name and phone number. Navigation will continue to reach out.

## 2025-06-16 NOTE — PROGRESS NOTES
I returned missed call from patient regarding biopsy results. I confirmed for her that her biopsy results came back as Right Breast IDC w/ DCIS. The radiologist is recommending a bilateral breast MRI which is scheduled for 06/16. I informed her that one of our breast oncology navigators would be reaching out in the coming days to discuss her recent diagnosis and also provide her with consultation appointments for both surgery and oncology.

## 2025-06-17 ENCOUNTER — TELEPHONE (OUTPATIENT)
Dept: CASE MANAGEMENT | Age: 50
End: 2025-06-17

## 2025-06-17 DIAGNOSIS — C50.911 MALIGNANT NEOPLASM OF RIGHT BREAST IN FEMALE, ESTROGEN RECEPTOR POSITIVE, UNSPECIFIED SITE OF BREAST (HCC): Primary | ICD-10-CM

## 2025-06-17 DIAGNOSIS — Z17.0 MALIGNANT NEOPLASM OF RIGHT BREAST IN FEMALE, ESTROGEN RECEPTOR POSITIVE, UNSPECIFIED SITE OF BREAST (HCC): Primary | ICD-10-CM

## 2025-06-17 SDOH — SOCIAL STABILITY: SOCIAL INSECURITY: WITHIN THE LAST YEAR, HAVE YOU BEEN AFRAID OF YOUR PARTNER OR EX-PARTNER?: NO

## 2025-06-17 SDOH — HEALTH STABILITY: MENTAL HEALTH: HOW OFTEN DO YOU HAVE A DRINK CONTAINING ALCOHOL?: 2-4 TIMES A MONTH

## 2025-06-17 SDOH — ECONOMIC STABILITY: FOOD INSECURITY: WITHIN THE PAST 12 MONTHS, YOU WORRIED THAT YOUR FOOD WOULD RUN OUT BEFORE YOU GOT THE MONEY TO BUY MORE.: NEVER TRUE

## 2025-06-17 SDOH — ECONOMIC STABILITY: FOOD INSECURITY: WITHIN THE PAST 12 MONTHS, THE FOOD YOU BOUGHT JUST DIDN'T LAST AND YOU DIDN'T HAVE MONEY TO GET MORE.: NEVER TRUE

## 2025-06-17 SDOH — HEALTH STABILITY: MENTAL HEALTH
DO YOU FEEL STRESS - TENSE, RESTLESS, NERVOUS, OR ANXIOUS, OR UNABLE TO SLEEP AT NIGHT BECAUSE YOUR MIND IS TROUBLED ALL THE TIME - THESE DAYS?: NOT AT ALL

## 2025-06-17 SDOH — ECONOMIC STABILITY: HOUSING INSECURITY: IN THE LAST 12 MONTHS, WAS THERE A TIME WHEN YOU WERE NOT ABLE TO PAY THE MORTGAGE OR RENT ON TIME?: NO

## 2025-06-17 SDOH — ECONOMIC STABILITY: FOOD INSECURITY: HOW HARD IS IT FOR YOU TO PAY FOR THE VERY BASICS LIKE FOOD, HOUSING, MEDICAL CARE, AND HEATING?: NOT HARD AT ALL

## 2025-06-17 SDOH — SOCIAL STABILITY: SOCIAL INSECURITY
WITHIN THE LAST YEAR, HAVE YOU BEEN RAPED OR FORCED TO HAVE ANY KIND OF SEXUAL ACTIVITY BY YOUR PARTNER OR EX-PARTNER?: NO

## 2025-06-17 SDOH — HEALTH STABILITY: PHYSICAL HEALTH: ON AVERAGE, HOW MANY MINUTES DO YOU ENGAGE IN EXERCISE AT THIS LEVEL?: 40 MIN

## 2025-06-17 SDOH — HEALTH STABILITY: PHYSICAL HEALTH: ON AVERAGE, HOW MANY DAYS PER WEEK DO YOU ENGAGE IN MODERATE TO STRENUOUS EXERCISE (LIKE A BRISK WALK)?: 4 DAYS

## 2025-06-17 SDOH — SOCIAL STABILITY: SOCIAL INSECURITY
WITHIN THE LAST YEAR, HAVE YOU BEEN KICKED, HIT, SLAPPED, OR OTHERWISE PHYSICALLY HURT BY YOUR PARTNER OR EX-PARTNER?: NO

## 2025-06-17 SDOH — SOCIAL STABILITY: SOCIAL INSECURITY: WITHIN THE LAST YEAR, HAVE YOU BEEN HUMILIATED OR EMOTIONALLY ABUSED IN OTHER WAYS BY YOUR PARTNER OR EX-PARTNER?: NO

## 2025-06-17 SDOH — ECONOMIC STABILITY: TRANSPORTATION INSECURITY: IN THE PAST 12 MONTHS, HAS LACK OF TRANSPORTATION KEPT YOU FROM MEDICAL APPOINTMENTS OR FROM GETTING MEDICATIONS?: NO

## 2025-06-17 SDOH — SOCIAL STABILITY: SOCIAL NETWORK: HOW OFTEN DO YOU GET TOGETHER WITH FRIENDS OR RELATIVES?: MORE THAN THREE TIMES A WEEK

## 2025-06-17 SDOH — SOCIAL STABILITY: SOCIAL INSECURITY: ARE YOU MARRIED, WIDOWED, DIVORCED, SEPARATED, NEVER MARRIED, OR LIVING WITH A PARTNER?: DIVORCED

## 2025-06-17 ASSESSMENT — ACTIVITIES OF DAILY LIVING (ADL): LACK_OF_TRANSPORTATION: NO

## 2025-06-17 ASSESSMENT — PATIENT HEALTH QUESTIONNAIRE - PHQ9
1. LITTLE INTEREST OR PLEASURE IN DOING THINGS: NOT AT ALL
SUM OF ALL RESPONSES TO PHQ QUESTIONS 1-9: 0
2. FEELING DOWN, DEPRESSED OR HOPELESS: NOT AT ALL

## 2025-06-17 NOTE — TELEPHONE ENCOUNTER
Nurse Navigation outreach related to medical oncology consult visit, intake, SDOH, and surgery consult    Navigation Intake 6/17/2025    I reviewed family history of cancer, Oncology Care Team, patient's personal history of cancer, and Social Determinants of Health. The role of navigation services, how to communicate with office, pending work up, and pending appointments have been reviewed and updated.  Referring provider notified of intake and upcoming appointments.    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 She has two sons ages 11 and 15. She has no family history of breast cancer.     MRI: completed 6/16  Pathology: Right breast IDC ER/SC + Her 2 negative       Appointment with Oncology: Dr Magdi Hannah 6/25  Appointment with Surgery: Dr Jania Villalpando 6/18     My Chart message to patient with navigation contact information and upcoming appointments.   Attached link for calendar for Cancer Support in the Community provided by the [facility] with opportunities for programs both in person and virtually.   Attached link for the [facility] Connection for counseling opportunities, support groups, financial assistance, and general physical support.     Navigation Assessment:  The following were identified as potential barriers to care:   1. None       Referrals placed: none  Goal of next outreach: Navigation will follow for MRI results and will follow up after initial oncology visit.   Time Spent: 20 minutes

## 2025-06-18 ENCOUNTER — OFFICE VISIT (OUTPATIENT)
Dept: SURGERY | Age: 50
End: 2025-06-18
Payer: COMMERCIAL

## 2025-06-18 VITALS
DIASTOLIC BLOOD PRESSURE: 80 MMHG | SYSTOLIC BLOOD PRESSURE: 122 MMHG | WEIGHT: 138 LBS | HEIGHT: 65 IN | BODY MASS INDEX: 22.99 KG/M2 | HEART RATE: 63 BPM

## 2025-06-18 DIAGNOSIS — C50.211: ICD-10-CM

## 2025-06-18 DIAGNOSIS — R92.8 ABNORMAL FINDING ON BREAST IMAGING: ICD-10-CM

## 2025-06-18 DIAGNOSIS — R93.89 ABNORMAL MRI: Primary | ICD-10-CM

## 2025-06-18 DIAGNOSIS — R92.333 HETEROGENEOUSLY DENSE TISSUE OF BOTH BREASTS ON MAMMOGRAPHY: ICD-10-CM

## 2025-06-18 DIAGNOSIS — C50.211: Primary | ICD-10-CM

## 2025-06-18 PROCEDURE — 99205 OFFICE O/P NEW HI 60 MIN: CPT | Performed by: SURGERY

## 2025-06-18 NOTE — PROGRESS NOTES
History of Present Illness  The patient presents for evaluation of breast cancer.    She is generally healthy with no chronic conditions and no regular medications. She occasionally uses Fioricet and sumatriptan for migraines. Family history includes her mother with treated thyroid cancer and her maternal uncle who passed from stomach cancer. She has two children, aged 11 and 15. She is not on hormone replacement therapy or birth control. Her last menstrual period was summer 2024, indicating late menopausal transition.    Screening mammogram revealed indeterminate right breast asymmetry and dense tissue. Imaging showed persistent distortion 6 cm from the nipple near 1:00, scattered benign calcifications, and a benign incidental cyst (0.3 cm). MRI indicated dense tissue, post-biopsy hematoma, and minimal rim enhancement around the hematoma. No abnormal lymph nodes were detected. Left breast showed focal clumped enhancement at 11-12 o'clock, 4 cm from the nipple, with no other concerning findings.    She recalls a prior mammogram requiring ultrasound of the same area, which revealed cysts. Reports significant breast changes during menstrual cycles. Surgical history includes  and gallbladder removal.    SOCIAL HISTORY  Occupation: Sales.    FAMILY HISTORY  Mother: Thyroid cancer, treated and cured. No other family history of cancer.       Results  Imaging:  - Screening mammogram: Indeterminate right breast asymmetry, dense tissue.  - MRI (right breast): Persistent distortion 6 cm from nipple near 1:00, benign calcifications, post-biopsy hematoma with minimal rim enhancement, no abnormal lymph nodes.  - MRI (left breast): Focal clumped enhancement at 11-12 o'clock, 4 cm from nipple, punctate calcifications, no abnormal nodes.  - MRI (chest/abdomen): No suspicious findings, benign T2 bright lesion in left sternum.    Biopsy:  - Right breast: Invasive cancer, low-grade ductal carcinoma in situ, grade 1

## 2025-06-19 ENCOUNTER — HOSPITAL ENCOUNTER (OUTPATIENT)
Dept: MAMMOGRAPHY | Age: 50
Discharge: HOME | End: 2025-06-22
Payer: COMMERCIAL

## 2025-06-19 ENCOUNTER — CLINICAL DOCUMENTATION (OUTPATIENT)
Dept: CASE MANAGEMENT | Age: 50
End: 2025-06-19

## 2025-06-19 DIAGNOSIS — D05.11 DUCTAL CARCINOMA IN SITU (DCIS) OF RIGHT BREAST: ICD-10-CM

## 2025-06-19 DIAGNOSIS — C50.911 INFILTRATING DUCTAL CARCINOMA OF BREAST, RIGHT (HCC): ICD-10-CM

## 2025-06-19 PROCEDURE — 76642 ULTRASOUND BREAST LIMITED: CPT

## 2025-06-19 PROCEDURE — G0279 TOMOSYNTHESIS, MAMMO: HCPCS

## 2025-06-19 NOTE — PROGRESS NOTES
I met the patient in person with Dr Villalpando 6/18. Dr Villalpando submitted mammaprint. The patient is also scheduled for additional breast imaging today and bone scan 6/23. She is scheduled for initial oncology visit with Dr Hannah 6/25. TB planned 6/26. Navigation will follow for mammaprint, imaging, TB and will follow up with the patient after initial oncology visit.    98.1

## 2025-06-20 ENCOUNTER — HOSPITAL ENCOUNTER (OUTPATIENT)
Dept: MAMMOGRAPHY | Age: 50
Discharge: HOME | End: 2025-06-23
Payer: COMMERCIAL

## 2025-06-20 DIAGNOSIS — C50.911 INFILTRATING DUCTAL CARCINOMA OF BREAST, RIGHT (HCC): ICD-10-CM

## 2025-06-20 DIAGNOSIS — D05.11 DUCTAL CARCINOMA IN SITU (DCIS) OF RIGHT BREAST: ICD-10-CM

## 2025-06-20 DIAGNOSIS — R92.8 ABNORMAL MRI, BREAST: ICD-10-CM

## 2025-06-20 DIAGNOSIS — R92.8 ABNORMAL FINDING ON MAMMOGRAPHY: ICD-10-CM

## 2025-06-20 PROCEDURE — 77065 DX MAMMO INCL CAD UNI: CPT

## 2025-06-20 PROCEDURE — 76098 X-RAY EXAM SURGICAL SPECIMEN: CPT

## 2025-06-20 PROCEDURE — 88342 IMHCHEM/IMCYTCHM 1ST ANTB: CPT

## 2025-06-20 PROCEDURE — 19081 BX BREAST 1ST LESION STRTCTC: CPT

## 2025-06-20 PROCEDURE — 88305 TISSUE EXAM BY PATHOLOGIST: CPT

## 2025-06-20 PROCEDURE — 88341 IMHCHEM/IMCYTCHM EA ADD ANTB: CPT

## 2025-06-20 PROCEDURE — 2500000003 HC RX 250 WO HCPCS: Performed by: INTERNAL MEDICINE

## 2025-06-20 PROCEDURE — 6360000002 HC RX W HCPCS: Performed by: INTERNAL MEDICINE

## 2025-06-20 RX ORDER — MAGNESIUM HYDROXIDE 1200 MG/15ML
250 LIQUID ORAL ONCE
Status: COMPLETED | OUTPATIENT
Start: 2025-06-20 | End: 2025-06-20

## 2025-06-20 RX ORDER — LIDOCAINE HYDROCHLORIDE 10 MG/ML
5 INJECTION, SOLUTION INFILTRATION; PERINEURAL ONCE
Status: COMPLETED | OUTPATIENT
Start: 2025-06-20 | End: 2025-06-20

## 2025-06-20 RX ORDER — LIDOCAINE HYDROCHLORIDE AND EPINEPHRINE 10; 10 MG/ML; UG/ML
20 INJECTION, SOLUTION INFILTRATION; PERINEURAL ONCE
Status: COMPLETED | OUTPATIENT
Start: 2025-06-20 | End: 2025-06-20

## 2025-06-20 RX ADMIN — SODIUM CHLORIDE 250 ML: 900 IRRIGANT IRRIGATION at 13:33

## 2025-06-20 RX ADMIN — LIDOCAINE HYDROCHLORIDE,EPINEPHRINE BITARTRATE 13 ML: 10; .01 INJECTION, SOLUTION INFILTRATION; PERINEURAL at 13:32

## 2025-06-20 RX ADMIN — LIDOCAINE HYDROCHLORIDE 3 ML: 10 INJECTION, SOLUTION INFILTRATION; PERINEURAL at 13:33

## 2025-06-20 ASSESSMENT — PAIN SCALES - GENERAL
PAINLEVEL_OUTOF10: 5
PAINLEVEL_OUTOF10: 5

## 2025-06-23 ENCOUNTER — HOSPITAL ENCOUNTER (OUTPATIENT)
Dept: NUCLEAR MEDICINE | Age: 50
Discharge: HOME | End: 2025-06-25
Attending: SURGERY
Payer: COMMERCIAL

## 2025-06-23 DIAGNOSIS — R93.89 ABNORMAL MRI: ICD-10-CM

## 2025-06-23 DIAGNOSIS — C50.211: ICD-10-CM

## 2025-06-23 PROCEDURE — A9503 TC99M MEDRONATE: HCPCS | Performed by: SURGERY

## 2025-06-23 PROCEDURE — 78306 BONE IMAGING WHOLE BODY: CPT | Performed by: SURGERY

## 2025-06-23 PROCEDURE — 3430000000 HC RX DIAGNOSTIC RADIOPHARMACEUTICAL: Performed by: SURGERY

## 2025-06-23 RX ORDER — TC 99M MEDRONATE 20 MG/10ML
26 INJECTION, POWDER, LYOPHILIZED, FOR SOLUTION INTRAVENOUS
Status: COMPLETED | OUTPATIENT
Start: 2025-06-23 | End: 2025-06-23

## 2025-06-23 RX ADMIN — TC 99M MEDRONATE 26 MILLICURIE: 20 INJECTION, POWDER, LYOPHILIZED, FOR SOLUTION INTRAVENOUS at 08:00

## 2025-06-24 ENCOUNTER — CLINICAL DOCUMENTATION (OUTPATIENT)
Dept: ONCOLOGY | Age: 50
End: 2025-06-24

## 2025-06-24 ENCOUNTER — TELEPHONE (OUTPATIENT)
Dept: MAMMOGRAPHY | Age: 50
End: 2025-06-24

## 2025-06-24 ENCOUNTER — RESULTS FOLLOW-UP (OUTPATIENT)
Dept: SURGERY | Age: 50
End: 2025-06-24

## 2025-06-24 LAB
GENE DIS ANL INTERP-IMP: NORMAL
TEMPUS GENES ANALYZED: NORMAL
TEMPUS INTERPRETATION REPORT: NORMAL
TEMPUS PORTAL: NORMAL

## 2025-06-24 NOTE — TELEPHONE ENCOUNTER
I spoke with the patient regarding the results of her recent breast biopsy. Pathology: benign LCIS. She is being followed by Dr Villalpando for a recent diagnosis of Right IDC & DCIS.  She will continue with her current management plan.

## 2025-06-24 NOTE — PROGRESS NOTES
The patient's mammoprint has returned as quantity not sufficient, will need to be sent on surgical specimen.Patient has known right breast IDC ERPR +, HER 2 -.  Patient completed a stereotactic left breast biopsy on 6-20-25, Pathology is pending, and completed a bone scan on 6-23-25.  Navigation will continue to follow for pathology results, [facility] pending, patient has oncology consult with Dr. Hannah on 6-25-25, TB is 6-26-25.    Addendum:  Patient called, she has seen bone scan results and mentioned Dr. Villalpando was going to call her with the results, message to Dr. Villalpando sent.

## 2025-06-25 ENCOUNTER — CLINICAL DOCUMENTATION (OUTPATIENT)
Dept: SURGERY | Age: 50
End: 2025-06-25

## 2025-06-25 ENCOUNTER — PREP FOR PROCEDURE (OUTPATIENT)
Dept: SURGERY | Age: 50
End: 2025-06-25

## 2025-06-25 ENCOUNTER — CLINICAL DOCUMENTATION (OUTPATIENT)
Dept: CASE MANAGEMENT | Age: 50
End: 2025-06-25

## 2025-06-25 ENCOUNTER — OFFICE VISIT (OUTPATIENT)
Dept: SURGERY | Age: 50
End: 2025-06-25
Payer: COMMERCIAL

## 2025-06-25 ENCOUNTER — OFFICE VISIT (OUTPATIENT)
Dept: ONCOLOGY | Age: 50
End: 2025-06-25
Payer: COMMERCIAL

## 2025-06-25 ENCOUNTER — TELEPHONE (OUTPATIENT)
Dept: CASE MANAGEMENT | Age: 50
End: 2025-06-25

## 2025-06-25 VITALS
HEART RATE: 65 BPM | DIASTOLIC BLOOD PRESSURE: 57 MMHG | HEIGHT: 65 IN | BODY MASS INDEX: 22.81 KG/M2 | WEIGHT: 136.9 LBS | SYSTOLIC BLOOD PRESSURE: 100 MMHG

## 2025-06-25 VITALS
HEIGHT: 65 IN | WEIGHT: 137.9 LBS | OXYGEN SATURATION: 99 % | SYSTOLIC BLOOD PRESSURE: 111 MMHG | BODY MASS INDEX: 22.98 KG/M2 | HEART RATE: 63 BPM | DIASTOLIC BLOOD PRESSURE: 63 MMHG | TEMPERATURE: 97.9 F | RESPIRATION RATE: 22 BRPM

## 2025-06-25 DIAGNOSIS — R92.333 HETEROGENEOUSLY DENSE TISSUE OF BOTH BREASTS ON MAMMOGRAPHY: ICD-10-CM

## 2025-06-25 DIAGNOSIS — Z17.0 MALIGNANT NEOPLASM OF UPPER-INNER QUADRANT OF RIGHT BREAST IN FEMALE, ESTROGEN RECEPTOR POSITIVE (HCC): Primary | ICD-10-CM

## 2025-06-25 DIAGNOSIS — R92.8 ABNORMAL FINDING ON BREAST IMAGING: ICD-10-CM

## 2025-06-25 DIAGNOSIS — C50.211 MALIGNANT NEOPLASM OF UPPER-INNER QUADRANT OF RIGHT BREAST IN FEMALE, ESTROGEN RECEPTOR POSITIVE (HCC): Primary | ICD-10-CM

## 2025-06-25 DIAGNOSIS — R94.8 ABNORMAL BONE SCAN OF THORACIC SPINE: Primary | ICD-10-CM

## 2025-06-25 DIAGNOSIS — R93.89 ABNORMAL MRI: ICD-10-CM

## 2025-06-25 DIAGNOSIS — R92.8 ABNORMAL MRI, BREAST: ICD-10-CM

## 2025-06-25 DIAGNOSIS — D05.02 LOBULAR CARCINOMA IN SITU OF LEFT BREAST: ICD-10-CM

## 2025-06-25 DIAGNOSIS — C50.211: Primary | ICD-10-CM

## 2025-06-25 DIAGNOSIS — D05.02 LOBULAR CARCINOMA IN SITU (LCIS) OF LEFT BREAST: ICD-10-CM

## 2025-06-25 PROCEDURE — 99205 OFFICE O/P NEW HI 60 MIN: CPT | Performed by: INTERNAL MEDICINE

## 2025-06-25 PROCEDURE — 99215 OFFICE O/P EST HI 40 MIN: CPT | Performed by: SURGERY

## 2025-06-25 ASSESSMENT — PATIENT HEALTH QUESTIONNAIRE - PHQ9
SUM OF ALL RESPONSES TO PHQ QUESTIONS 1-9: 0
2. FEELING DOWN, DEPRESSED OR HOPELESS: NOT AT ALL
SUM OF ALL RESPONSES TO PHQ QUESTIONS 1-9: 0
SUM OF ALL RESPONSES TO PHQ QUESTIONS 1-9: 0
1. LITTLE INTEREST OR PLEASURE IN DOING THINGS: NOT AT ALL
SUM OF ALL RESPONSES TO PHQ QUESTIONS 1-9: 0

## 2025-06-25 NOTE — PROGRESS NOTES
stereotactic breast biopsy procedure, risks, benefits, potential complications and alternatives have been reviewed with the patient. The patient and procedure site laterality have been confirmed, the right breast has been marked with the treatment team present. Verbal and written informed consent to proceed was obtained. PROCEDURE: The focus of distortion in the right breast at 1:00 6 cm from the nipple was localized from a craniocaudal approach. The overlying skin was sterilely prepped and draped. Approximately 1 mL of 1% Lidocaine was used for superficial anesthesia. Approximately 5 mL of lidocaine with Epinephrine was used for deeper anesthesia. A skin nick was made. Using stereotactic guidance, a 9 gauge needle was advanced into the breast. After good position of the needle was confirmed with stereotactic imaging, 12 samples were then obtained with vacuum assistance. Additional 10 mL of lidocaine with Epinephrine was instilled with sampling.  A mini cork clip was left to oralia the biopsy site.  The needle was withdrawn and hemostasis was obtained. The dermotomy site was cleansed and covered with sterile bandage. The Patient tolerated the procedure well. Post-procedure instructions were given to the patient, who left the department in good condition. SPECIMEN RADIOGRAPH:  The targeted area is present with the stereotactic specimen. Specimens have been placed in formalin, labeled and sent to pathology. POSTPROCEDURE MAMMOGRAM: BREAST DENSITY: There are scattered areas of fibroglandular density.  (#BDB) Patient was taken from the stereotactic procedure room to the mammography suite for postprocedure mammogram. Right mammogram shows the biopsy clip in expected location.     Successful vacuum assisted stereotactic biopsy and tissue marker placement of right breast architectural distortion 1:00, 6 cm from the nipple Electronically signed by REGAN ONEAL Performing Facility: [facility]

## 2025-06-25 NOTE — TELEPHONE ENCOUNTER
The patient called and we discussed her visit with Dr Hannah yesterday which she said went well. She would like to meet with Dr Villalpando and discuss surgery plan in person. She also may seek a virtual second opinion just for peace of mind. She will be presented at  6/26. Navigation will follow for plan of care.

## 2025-06-25 NOTE — PROGRESS NOTES
Automatic Reconciliation, Ar      Past Medical History:   Diagnosis Date    Abnormal Papanicolaou smear of cervix     Allergic rhinitis     Pet dander and seasonal allergies    Breast cancer (HCC) 23    Cystocele     Rectocele    Diverticulitis     GERD (gastroesophageal reflux disease)     Diverticulitis    Migraines     Missed  2017    D&C    Ovarian cyst     SAB (spontaneous )     Vaginal delivery     x1    Vitamin D deficiency       Past Surgical History:   Procedure Laterality Date    BREAST BIOPSY Right     BREAST BIOPSY Left 2025    Stereotactic Biopsy     SECTION  8/20/2013    X1    CHOLECYSTECTOMY  2013    COLONOSCOPY      COLPOSCOPY      DILATION AND CURETTAGE OF UTERUS      FRANCE STEREO BREAST BX W LOC DEVICE 1ST LESION LEFT Left 2025    FRANCE STEREO BREAST BX W LOC DEVICE 1ST LESION LEFT 2025 Jania Watson MD [facility]    FRANCE STEREO BREAST BX W LOC DEVICE 1ST LESION RIGHT Right 2025    FRANCE STEREO BREAST BX W LOC DEVICE 1ST LESION RIGHT 2025 Yvette Rain DO [facility]    OTHER SURGICAL HISTORY      IVF    PELVIC LAPAROSCOPY      UPPER GASTROINTESTINAL ENDOSCOPY        Cancer-related family history includes Cancer in her maternal uncle and mother; Stomach Cancer in her maternal uncle; Thyroid Cancer in her mother. There is no history of Breast Cancer.   Social History     Socioeconomic History    Marital status:      Spouse name: None    Number of children: None    Years of education: None    Highest education level: None   Tobacco Use    Smoking status: Never     Passive exposure: Never    Smokeless tobacco: Never   Vaping Use    Vaping status: Never Used   Substance and Sexual Activity    Alcohol use: Yes     Alcohol/week: 3.0 standard drinks of alcohol     Types: 3 Cans of beer per week    Drug use: No    Sexual activity: Yes     Partners: Male     Birth control/protection: None

## 2025-06-25 NOTE — PROGRESS NOTES
Met with patient in person at follow up OV with Dr. Villalpando.  [facility] PET scan ordered and update to PET department to reach out to patient to schedule.  Navigation will continue to follow for plan of care.    Addendum [facility] PET scan scheduled for 6-30-25, discussed with patient.    Addendum :  Patient scheduled for surgery with Dr Villalpando 7-22-25.  Navigation will follow for PET results, path results from surgery , and post op TB is 8-7-25.

## 2025-06-25 NOTE — PATIENT INSTRUCTIONS
Patient Information from Today's Visit    The members of your Oncology Medical Home are listed below:    Physician Provider: Dr. Magdi Hannah  Advanced Practice Clinician: Nanette Mendez  Registered Nurse: Wolf RODRIGUEZ   Nurse Navigator: Nasra BOSS RN  Medical Assistant: Nasra \"Jessica\" DUNIA   : Mey \"Edna\" SERA.   Supportive Care Services: [facility]    Diagnosis (Information Sheet Provided on Day of Diagnosis):     --New Patient: Breast    Follow Up Instructions:     -Reviewed past medical history and recent imaging.   -Discussed hormone therapies and tumor testing.  -We will recheck your labs and follow up after surgery in 5-6 weeks.      Has Treatment Plan Been Finalized? No    Current Labs: N/A      Please refer to After Visit Summary or [employer] for upcoming appointment information. Please call our office for rescheduling needs at least 24 hours before your scheduled appointment time.If you have any questions regarding your upcoming schedule please reach out to your care team through [employer] or call [phone].     Please notify your assigned Nurse Navigator of any unplanned hospital admissions or Emergency Room visits within 24 hours of discharge.    -------------------------------------------------------------------------------------------------------------------  Please call our office at [phone] if you have any  of the following symptoms:   Fever of 100.5 or greater  Chills  Shortness of breath  Swelling or pain in one leg    After office hours an answering service is available and will contact a provider for emergencies or if you are experiencing any of the above symptoms.        CHARIS HARRIS RN

## 2025-06-25 NOTE — PROGRESS NOTES
My chart message sent to the patient in follow up after her initial oncology visit. She will be presented at TB 6/26. Navigation will follow for surgical plan.

## 2025-06-26 DIAGNOSIS — D05.02 LOBULAR CARCINOMA IN SITU OF LEFT BREAST: Primary | ICD-10-CM

## 2025-06-26 DIAGNOSIS — Z17.0 MALIGNANT NEOPLASM OF UPPER-INNER QUADRANT OF RIGHT BREAST IN FEMALE, ESTROGEN RECEPTOR POSITIVE (HCC): Primary | ICD-10-CM

## 2025-06-26 DIAGNOSIS — C50.211: ICD-10-CM

## 2025-06-26 DIAGNOSIS — C50.211 MALIGNANT NEOPLASM OF UPPER-INNER QUADRANT OF RIGHT BREAST IN FEMALE, ESTROGEN RECEPTOR POSITIVE (HCC): Primary | ICD-10-CM

## 2025-06-30 ENCOUNTER — HOSPITAL ENCOUNTER (OUTPATIENT)
Dept: PET IMAGING | Age: 50
Discharge: HOME | End: 2025-07-03
Payer: COMMERCIAL

## 2025-06-30 DIAGNOSIS — R94.8 ABNORMAL BONE SCAN OF THORACIC SPINE: ICD-10-CM

## 2025-06-30 DIAGNOSIS — R92.8 ABNORMAL MRI, BREAST: ICD-10-CM

## 2025-06-30 PROCEDURE — 3430000000 HC RX DIAGNOSTIC RADIOPHARMACEUTICAL: Performed by: SURGERY

## 2025-06-30 PROCEDURE — 2500000003 HC RX 250 WO HCPCS: Performed by: SURGERY

## 2025-06-30 PROCEDURE — 78816 PET IMAGE W/CT FULL BODY: CPT

## 2025-06-30 PROCEDURE — A9591 FLUOROESTRADIOL F 18: HCPCS | Performed by: SURGERY

## 2025-06-30 RX ORDER — SODIUM CHLORIDE 0.9 % (FLUSH) 0.9 %
40 SYRINGE (ML) INJECTION AS NEEDED
Status: DISCONTINUED | OUTPATIENT
Start: 2025-06-30 | End: 2025-07-04 | Stop reason: HOSPADM

## 2025-06-30 RX ADMIN — FLUOROESTRADIOL F 18 5.32 MILLICURIE: 100 INJECTION INTRAVENOUS at 14:37

## 2025-06-30 RX ADMIN — SODIUM CHLORIDE, PRESERVATIVE FREE 40 ML: 5 INJECTION INTRAVENOUS at 14:37

## 2025-07-01 DIAGNOSIS — R92.333 HETEROGENEOUSLY DENSE TISSUE OF BOTH BREASTS ON MAMMOGRAPHY: ICD-10-CM

## 2025-07-01 DIAGNOSIS — D05.02 LOBULAR CARCINOMA IN SITU (LCIS) OF LEFT BREAST: ICD-10-CM

## 2025-07-01 DIAGNOSIS — C50.211 MALIGNANT NEOPLASM OF UPPER-INNER QUADRANT OF RIGHT FEMALE BREAST, UNSPECIFIED ESTROGEN RECEPTOR STATUS (HCC): ICD-10-CM

## 2025-07-01 DIAGNOSIS — R92.8 ABNORMAL FINDING ON BREAST IMAGING: ICD-10-CM

## 2025-07-01 DIAGNOSIS — C50.211: ICD-10-CM

## 2025-07-01 DIAGNOSIS — D05.02 LOBULAR CARCINOMA IN SITU OF LEFT BREAST: Primary | ICD-10-CM

## 2025-07-02 ENCOUNTER — RESULTS FOLLOW-UP (OUTPATIENT)
Dept: SURGERY | Age: 50
End: 2025-07-02

## 2025-07-03 ENCOUNTER — CLINICAL DOCUMENTATION (OUTPATIENT)
Dept: CASE MANAGEMENT | Age: 50
End: 2025-07-03

## 2025-07-03 NOTE — PROGRESS NOTES
Presented at Multidisciplinary Breast Conference today 7/3/2025.  Surgery planned 7/22 and post op TB 8/7. Oncology follow up planned with Dr Hannah 8/19. Navigation will review chart for surgical pathology and TB presentation.

## 2025-07-08 ENCOUNTER — TELEPHONE (OUTPATIENT)
Dept: ONCOLOGY | Age: 50
End: 2025-07-08

## 2025-07-08 NOTE — TELEPHONE ENCOUNTER
Patient noted to have cancelled upcoming surgery with Dr Villalpando.  Per chart review, patient appears to be having surgery in [place] 7-15-25.  Navigation will follow up for path review and follow up with Dr. Hannah is still scheduled for 8-19-25 at this time.

## 2025-07-23 ENCOUNTER — CLINICAL DOCUMENTATION (OUTPATIENT)
Dept: ONCOLOGY | Age: 50
End: 2025-07-23

## 2025-07-23 NOTE — PROGRESS NOTES
Patient noted to have completed breast surgery at the [facility] on 7-15-25, pathology and records available in Epic.  Message to patient to confirm if she would like to keep her follow up with Dr. Hannah for local care on 8-19-25.  Navigation will follow up for plan of care per patient direction later this week.    Addendum:  Next appointment scheduled with Dr. Hannah on 8-19-25, navigation will follow for review after appointment. Surgical care has been completed at the [facility].

## 2025-07-29 ENCOUNTER — TELEPHONE (OUTPATIENT)
Dept: ONCOLOGY | Age: 50
End: 2025-07-29

## 2025-07-29 DIAGNOSIS — Z17.0 MALIGNANT NEOPLASM OF UPPER-INNER QUADRANT OF RIGHT BREAST IN FEMALE, ESTROGEN RECEPTOR POSITIVE (HCC): Primary | ICD-10-CM

## 2025-07-29 DIAGNOSIS — C50.211 MALIGNANT NEOPLASM OF UPPER-INNER QUADRANT OF RIGHT BREAST IN FEMALE, ESTROGEN RECEPTOR POSITIVE (HCC): Primary | ICD-10-CM

## 2025-07-29 NOTE — TELEPHONE ENCOUNTER
Patient called to follow up after having lumpectomy with [facility] at [facility] on 7-15-25.  She would like to meet with radiation here soon to see if she can do radiation here or if she will return to [facility].  She will keep her 8-19-25 appointment with Dr. Hannah.  Mammoprint was completed 6-30-25.  Navigation will follow after her appointment with Dr. Hannah.    Addendum:  She will see Dr Figueroa in radiation on 8-6-25.    Pathology report from 7-15-25 ( in Epic under Lab)  FINAL DIAGNOSIS:  A) Right breast, lumpectomy: Invasive ductal carcinoma, Laura grade 1, forming a dominant mass  measuring 6 mm, and two other separate foci measuring 2 mm each. Foci of low-grade ductal carcinoma in situ  (DCIS) and atypical ductal carcinoma. No lymphovascular invasion. Biopsy site changes identified. Margins are  negative. Invasive carcinoma is 4 mm to posterior margin (closest). DCIS is <1 mm to the medial, lateral, and  posterior (other margins are >2 mm). See synoptic template.  B) Left breast, lumpectomy: Lobular carcinoma in situ and atypical lobular hyperplasia.  C) [place] lymph node #1, right axilla, excision: One lymph node, negative for metastatic carcinoma (0/1).  D) [place] lymph node #2, right axilla, excision: One lymph node, negative for metastatic carcinoma (0/1).  E) [place] lymph node #3, right axilla, excision: One lymph node, negative for metastatic carcinoma (0/1).  Comment: On part A, immunostains performed on A5 demonstrate invasive carcinoma highlighted by AE1/ AE3  and lacking myoepithelial staining for p63 and SMA. E-cadherin performed on part B is negative. AE1/AE3  immunostains performed on parts C, D, and E are negative for occult metastasis.    Navigation will follow up after follow up with Dr. Hannah for plan of care.

## 2025-08-06 ENCOUNTER — HOSPITAL ENCOUNTER (OUTPATIENT)
Dept: RADIATION ONCOLOGY | Age: 50
Setting detail: RECURRING SERIES
Discharge: HOME | End: 2025-08-09
Payer: COMMERCIAL

## 2025-08-06 VITALS
BODY MASS INDEX: 23.11 KG/M2 | DIASTOLIC BLOOD PRESSURE: 65 MMHG | HEIGHT: 65 IN | SYSTOLIC BLOOD PRESSURE: 101 MMHG | TEMPERATURE: 98.2 F | HEART RATE: 59 BPM | OXYGEN SATURATION: 100 % | WEIGHT: 138.7 LBS | RESPIRATION RATE: 17 BRPM

## 2025-08-06 PROCEDURE — 99211 OFF/OP EST MAY X REQ PHY/QHP: CPT

## 2025-08-06 ASSESSMENT — PATIENT HEALTH QUESTIONNAIRE - PHQ9
2. FEELING DOWN, DEPRESSED OR HOPELESS: NOT AT ALL
SUM OF ALL RESPONSES TO PHQ QUESTIONS 1-9: 0
1. LITTLE INTEREST OR PLEASURE IN DOING THINGS: NOT AT ALL
SUM OF ALL RESPONSES TO PHQ QUESTIONS 1-9: 0

## 2025-08-18 ENCOUNTER — HOSPITAL ENCOUNTER (OUTPATIENT)
Dept: RADIATION ONCOLOGY | Age: 50
Setting detail: RECURRING SERIES
Discharge: HOME | End: 2025-08-21
Payer: COMMERCIAL

## 2025-08-18 PROCEDURE — 77332 RADIATION TREATMENT AID(S): CPT

## 2025-08-18 PROCEDURE — 77290 THER RAD SIMULAJ FIELD CPLX: CPT

## 2025-08-19 ENCOUNTER — OFFICE VISIT (OUTPATIENT)
Dept: ONCOLOGY | Age: 50
End: 2025-08-19
Payer: COMMERCIAL

## 2025-08-19 ENCOUNTER — HOSPITAL ENCOUNTER (OUTPATIENT)
Dept: LAB | Age: 50
Discharge: HOME | End: 2025-08-19

## 2025-08-19 VITALS
SYSTOLIC BLOOD PRESSURE: 100 MMHG | HEART RATE: 73 BPM | RESPIRATION RATE: 22 BRPM | DIASTOLIC BLOOD PRESSURE: 60 MMHG | HEIGHT: 65 IN | TEMPERATURE: 98.3 F | BODY MASS INDEX: 22.99 KG/M2 | OXYGEN SATURATION: 99 % | WEIGHT: 138 LBS

## 2025-08-19 DIAGNOSIS — C50.211 MALIGNANT NEOPLASM OF UPPER-INNER QUADRANT OF RIGHT FEMALE BREAST, UNSPECIFIED ESTROGEN RECEPTOR STATUS (HCC): ICD-10-CM

## 2025-08-19 DIAGNOSIS — C50.211 MALIGNANT NEOPLASM OF UPPER-INNER QUADRANT OF RIGHT BREAST IN FEMALE, ESTROGEN RECEPTOR POSITIVE (HCC): Primary | ICD-10-CM

## 2025-08-19 DIAGNOSIS — Z17.0 MALIGNANT NEOPLASM OF UPPER-INNER QUADRANT OF RIGHT BREAST IN FEMALE, ESTROGEN RECEPTOR POSITIVE (HCC): Primary | ICD-10-CM

## 2025-08-19 PROCEDURE — 99215 OFFICE O/P EST HI 40 MIN: CPT | Performed by: INTERNAL MEDICINE

## 2025-08-19 ASSESSMENT — PATIENT HEALTH QUESTIONNAIRE - PHQ9
2. FEELING DOWN, DEPRESSED OR HOPELESS: NOT AT ALL
SUM OF ALL RESPONSES TO PHQ QUESTIONS 1-9: 0
SUM OF ALL RESPONSES TO PHQ QUESTIONS 1-9: 0
1. LITTLE INTEREST OR PLEASURE IN DOING THINGS: NOT AT ALL
SUM OF ALL RESPONSES TO PHQ QUESTIONS 1-9: 0
SUM OF ALL RESPONSES TO PHQ QUESTIONS 1-9: 0

## 2025-08-20 ENCOUNTER — CLINICAL DOCUMENTATION (OUTPATIENT)
Dept: CASE MANAGEMENT | Age: 50
End: 2025-08-20

## 2025-09-02 ENCOUNTER — HOSPITAL ENCOUNTER (OUTPATIENT)
Dept: RADIATION ONCOLOGY | Age: 50
Setting detail: RECURRING SERIES
Discharge: HOME | End: 2025-09-05
Payer: COMMERCIAL

## 2025-09-02 LAB
RAD ONC ARIA COURSE FIRST TREATMENT DATE: NORMAL
RAD ONC ARIA COURSE ID: NORMAL
RAD ONC ARIA COURSE INTENT: NORMAL
RAD ONC ARIA COURSE LAST TREATMENT DATE: NORMAL
RAD ONC ARIA COURSE SESSION NUMBER: 1
RAD ONC ARIA COURSE START DATE: NORMAL
RAD ONC ARIA COURSE TREATMENT ELAPSED DAYS: 0
RAD ONC ARIA PLAN FRACTIONS TREATED TO DATE: 1
RAD ONC ARIA PLAN ID: NORMAL
RAD ONC ARIA PLAN PRESCRIBED DOSE PER FRACTION: 2.67 GY
RAD ONC ARIA PLAN PRIMARY REFERENCE POINT: NORMAL
RAD ONC ARIA PLAN TOTAL FRACTIONS PRESCRIBED: 15
RAD ONC ARIA PLAN TOTAL PRESCRIBED DOSE: 4005 CGY
RAD ONC ARIA REFERENCE POINT DOSAGE GIVEN TO DATE: 2.67 GY
RAD ONC ARIA REFERENCE POINT ID: NORMAL
RAD ONC ARIA REFERENCE POINT SESSION DOSAGE GIVEN: 2.67 GY

## 2025-09-02 PROCEDURE — 77412 RADIATION TX DELIVERY LVL 3: CPT

## 2025-09-02 PROCEDURE — 77280 THER RAD SIMULAJ FIELD SMPL: CPT

## 2025-09-03 ENCOUNTER — HOSPITAL ENCOUNTER (OUTPATIENT)
Dept: RADIATION ONCOLOGY | Age: 50
Setting detail: RECURRING SERIES
Discharge: HOME | End: 2025-09-06
Payer: COMMERCIAL

## 2025-09-03 LAB
RAD ONC ARIA COURSE FIRST TREATMENT DATE: NORMAL
RAD ONC ARIA COURSE ID: NORMAL
RAD ONC ARIA COURSE INTENT: NORMAL
RAD ONC ARIA COURSE LAST TREATMENT DATE: NORMAL
RAD ONC ARIA COURSE SESSION NUMBER: 2
RAD ONC ARIA COURSE START DATE: NORMAL
RAD ONC ARIA COURSE TREATMENT ELAPSED DAYS: 1
RAD ONC ARIA PLAN FRACTIONS TREATED TO DATE: 2
RAD ONC ARIA PLAN ID: NORMAL
RAD ONC ARIA PLAN PRESCRIBED DOSE PER FRACTION: 2.67 GY
RAD ONC ARIA PLAN PRIMARY REFERENCE POINT: NORMAL
RAD ONC ARIA PLAN TOTAL FRACTIONS PRESCRIBED: 15
RAD ONC ARIA PLAN TOTAL PRESCRIBED DOSE: 4005 CGY
RAD ONC ARIA REFERENCE POINT DOSAGE GIVEN TO DATE: 5.34 GY
RAD ONC ARIA REFERENCE POINT ID: NORMAL
RAD ONC ARIA REFERENCE POINT SESSION DOSAGE GIVEN: 2.67 GY

## 2025-09-03 PROCEDURE — 77412 RADIATION TX DELIVERY LVL 3: CPT

## 2025-09-04 LAB
RAD ONC ARIA COURSE FIRST TREATMENT DATE: NORMAL
RAD ONC ARIA COURSE ID: NORMAL
RAD ONC ARIA COURSE INTENT: NORMAL
RAD ONC ARIA COURSE LAST TREATMENT DATE: NORMAL
RAD ONC ARIA COURSE SESSION NUMBER: 3
RAD ONC ARIA COURSE START DATE: NORMAL
RAD ONC ARIA COURSE TREATMENT ELAPSED DAYS: 2
RAD ONC ARIA PLAN FRACTIONS TREATED TO DATE: 3
RAD ONC ARIA PLAN ID: NORMAL
RAD ONC ARIA PLAN PRESCRIBED DOSE PER FRACTION: 2.67 GY
RAD ONC ARIA PLAN PRIMARY REFERENCE POINT: NORMAL
RAD ONC ARIA PLAN TOTAL FRACTIONS PRESCRIBED: 15
RAD ONC ARIA PLAN TOTAL PRESCRIBED DOSE: 4005 CGY
RAD ONC ARIA REFERENCE POINT DOSAGE GIVEN TO DATE: 8.01 GY
RAD ONC ARIA REFERENCE POINT ID: NORMAL
RAD ONC ARIA REFERENCE POINT SESSION DOSAGE GIVEN: 2.67 GY

## 2025-09-05 LAB
RAD ONC ARIA COURSE FIRST TREATMENT DATE: NORMAL
RAD ONC ARIA COURSE ID: NORMAL
RAD ONC ARIA COURSE INTENT: NORMAL
RAD ONC ARIA COURSE LAST TREATMENT DATE: NORMAL
RAD ONC ARIA COURSE SESSION NUMBER: 4
RAD ONC ARIA COURSE START DATE: NORMAL
RAD ONC ARIA COURSE TREATMENT ELAPSED DAYS: 3
RAD ONC ARIA PLAN FRACTIONS TREATED TO DATE: 4
RAD ONC ARIA PLAN ID: NORMAL
RAD ONC ARIA PLAN PRESCRIBED DOSE PER FRACTION: 2.67 GY
RAD ONC ARIA PLAN PRIMARY REFERENCE POINT: NORMAL
RAD ONC ARIA PLAN TOTAL FRACTIONS PRESCRIBED: 15
RAD ONC ARIA PLAN TOTAL PRESCRIBED DOSE: 4005 CGY
RAD ONC ARIA REFERENCE POINT DOSAGE GIVEN TO DATE: 10.68 GY
RAD ONC ARIA REFERENCE POINT ID: NORMAL
RAD ONC ARIA REFERENCE POINT SESSION DOSAGE GIVEN: 2.67 GY

## (undated) DEVICE — PVC URETHRAL CATHETER: Brand: DOVER

## (undated) DEVICE — CARDINAL HEALTH FLEXIBLE LIGHT HANDLE COVER: Brand: CARDINAL HEALTH

## (undated) DEVICE — TRAY PREP DRY W/ PREM GLV 2 APPL 6 SPNG 2 UNDPD 1 OVERWRAP

## (undated) DEVICE — REM POLYHESIVE ADULT PATIENT RETURN ELECTRODE: Brand: VALLEYLAB

## (undated) DEVICE — PERI-PAD,MODERATE: Brand: CURITY

## (undated) DEVICE — SOLUTION IV 1000ML 0.9% SOD CHL

## (undated) DEVICE — Z DISCONTINUED USE 2659133 CANNULA VAC DIA8MM CRV SEMI RIG W/ ROUNDED TIP TAPR END

## (undated) DEVICE — CONTAINER SPEC HISTOLOGY 900ML POLYPR

## (undated) DEVICE — DRAPE TWL SURG 16X26IN BLU ORB04] ALLCARE INC]

## (undated) DEVICE — DRAPE,UNDERBUTTOCKS,PCH,STERILE: Brand: MEDLINE

## (undated) DEVICE — U.V.A.C. SWIVEL HANDLE W/TUBING, 6': Brand: CONVERTORS

## (undated) DEVICE — GOWN,REINF,POLY,ECL,PP SLV,XL: Brand: MEDLINE

## (undated) DEVICE — CYSTO: Brand: MEDLINE INDUSTRIES, INC.